# Patient Record
Sex: MALE | Race: WHITE | NOT HISPANIC OR LATINO | Employment: FULL TIME | URBAN - METROPOLITAN AREA
[De-identification: names, ages, dates, MRNs, and addresses within clinical notes are randomized per-mention and may not be internally consistent; named-entity substitution may affect disease eponyms.]

---

## 2023-05-12 ENCOUNTER — OFFICE VISIT (OUTPATIENT)
Dept: PRIMARY CARE | Facility: CLINIC | Age: 63
End: 2023-05-12
Payer: COMMERCIAL

## 2023-05-12 VITALS
DIASTOLIC BLOOD PRESSURE: 78 MMHG | SYSTOLIC BLOOD PRESSURE: 120 MMHG | HEIGHT: 73 IN | RESPIRATION RATE: 17 BRPM | HEART RATE: 68 BPM | BODY MASS INDEX: 28.1 KG/M2 | OXYGEN SATURATION: 98 % | WEIGHT: 212 LBS

## 2023-05-12 DIAGNOSIS — I10 HYPERTENSION, UNSPECIFIED TYPE: ICD-10-CM

## 2023-05-12 DIAGNOSIS — Z00.00 ANNUAL PHYSICAL EXAM: Primary | ICD-10-CM

## 2023-05-12 DIAGNOSIS — E78.00 HYPERCHOLESTEROLEMIA: ICD-10-CM

## 2023-05-12 DIAGNOSIS — E04.1 THYROID NODULE: ICD-10-CM

## 2023-05-12 DIAGNOSIS — I77.810 AORTIC ROOT DILATATION (CMS-HCC): ICD-10-CM

## 2023-05-12 PROBLEM — I35.1 MILD AORTIC REGURGITATION: Status: ACTIVE | Noted: 2023-05-12

## 2023-05-12 PROBLEM — N13.8 BPH WITH OBSTRUCTION/LOWER URINARY TRACT SYMPTOMS: Status: ACTIVE | Noted: 2023-05-12

## 2023-05-12 PROBLEM — K21.9 LARYNGOPHARYNGEAL REFLUX (LPR): Status: ACTIVE | Noted: 2023-05-12

## 2023-05-12 PROBLEM — A48.1 LEGIONELLA PNEUMONIA (MULTI): Status: ACTIVE | Noted: 2023-05-12

## 2023-05-12 PROBLEM — R74.8 ELEVATED ALKALINE PHOSPHATASE LEVEL: Status: ACTIVE | Noted: 2023-05-12

## 2023-05-12 PROBLEM — J34.2 NASAL SEPTAL DEVIATION: Status: ACTIVE | Noted: 2023-05-12

## 2023-05-12 PROBLEM — S09.93XA INJURY OF JAW: Status: ACTIVE | Noted: 2023-05-12

## 2023-05-12 PROBLEM — D64.9 ANEMIA: Status: ACTIVE | Noted: 2023-05-12

## 2023-05-12 PROBLEM — H91.91 HEARING LOSS IN RIGHT EAR: Status: ACTIVE | Noted: 2023-05-12

## 2023-05-12 PROBLEM — N40.1 BPH WITH OBSTRUCTION/LOWER URINARY TRACT SYMPTOMS: Status: ACTIVE | Noted: 2023-05-12

## 2023-05-12 PROBLEM — R17 TOTAL BILIRUBIN, ELEVATED: Status: ACTIVE | Noted: 2023-05-12

## 2023-05-12 PROBLEM — Q76.2 CONGENITAL SPONDYLOLISTHESIS: Status: ACTIVE | Noted: 2023-05-12

## 2023-05-12 PROBLEM — N20.0 CALCULUS OF KIDNEY: Status: ACTIVE | Noted: 2023-05-12

## 2023-05-12 PROBLEM — A48.1 LEGIONELLA PNEUMONIA (MULTI): Status: RESOLVED | Noted: 2023-05-12 | Resolved: 2023-05-12

## 2023-05-12 PROBLEM — R97.20 ELEVATED PSA: Status: ACTIVE | Noted: 2023-05-12

## 2023-05-12 PROBLEM — J30.9 ALLERGIC RHINITIS: Status: ACTIVE | Noted: 2023-05-12

## 2023-05-12 PROCEDURE — 1036F TOBACCO NON-USER: CPT | Performed by: SPECIALIST

## 2023-05-12 PROCEDURE — 3078F DIAST BP <80 MM HG: CPT | Performed by: SPECIALIST

## 2023-05-12 PROCEDURE — 99396 PREV VISIT EST AGE 40-64: CPT | Performed by: SPECIALIST

## 2023-05-12 PROCEDURE — 3074F SYST BP LT 130 MM HG: CPT | Performed by: SPECIALIST

## 2023-05-12 RX ORDER — MULTIVITAMIN
1 TABLET ORAL DAILY
COMMUNITY

## 2023-05-12 RX ORDER — LISINOPRIL 40 MG/1
1 TABLET ORAL DAILY
COMMUNITY
Start: 2012-11-06 | End: 2023-12-12

## 2023-05-12 RX ORDER — GABAPENTIN 600 MG/1
1800 TABLET, FILM COATED ORAL
COMMUNITY
Start: 2019-06-03 | End: 2023-05-19 | Stop reason: SDUPTHER

## 2023-05-12 NOTE — PROGRESS NOTES
"Subjective   Patient ID: Christiano Artis is a 63 y.o. male who presents for Annual Exam.  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, hx of Amarosis Fugax x 2 off ASA since 3/2015, FNA thyroid Nodule, Gallstones    Had greenlight x 2 and plans Holep in 8/3/2022  Last year, hit with hockey stick and thinks he broke rib pain resolved after 6 weeks    Allergies   Allergen Reactions    Ciprofloxacin Rash    Erythromycin Rash    Tetracyclines Rash      Current Outpatient Medications   Medication Instructions    Gralise 1,800 mg, oral, Daily with evening meal    lisinopril 40 mg tablet 1 tablet, oral, Daily    multivitamin tablet 1 tablet, oral, Daily        Review of Systems  Constitutional  No fatigue, no fevers, no chills, no unintentional weight loss,   HEENT:  No headaches, no dizziness, no double vision, no blurred vision, vitreous detachment, positive hearing loss right hearing aid  Cardiovascular:  No chest pain, no palpitations, no shortness of breath with exertion (one flight of stairs),   Respiratory:  No cough, no hemoptysis, no wheezing, No shortness of breath at rest  GI:  No dysphagia, no odynophagia, no reflux, no abdominal pain, no nausea, no vomiting, no changes in bowel habits, no bright red blood per rectum, no melena  :  No urinary frequency, no dysuria, no urine incontinence  MSK:  No falls, no joint pain, no joint swelling  Neuro:  No tremors, no extremity weakness, no changes in sensation    Physical Exam  /78   Pulse 68   Resp 17   Ht 1.854 m (6' 1\")   Wt 96.2 kg (212 lb)   SpO2 98%   BMI 27.97 kg/m²   General: Well-appearing  M in no acute distress, well nourished, well hydrated  Head:  Normocephalic, atraumatic  Eyes:  Anicteric sclera, pupils equal  Ears:       TMs " intact right hearing aid removed for exam    Oral:     Mask in place (not examined due to pandemic)  Neck:   Supple, no cervical/supraclavicular adenopathy, no thyromegaly or nodules appreciated on exam  Cor:      Regular rate, normal S1, S2, no murmurs appreciated, no S3, no S4   Lungs:   Clear to auscultation b/l, no wheezes, no rhonchi, no crackles, no accessory respiratory muscle use  Abd:          Soft, nontender, no guarding, no rebound, no hepatosplenomegaly appreciated   Ext:            No lower extremity edema, no palpable cords  Pulses:      Pedal pulses intact  Neuro:   CN2-12 grossly intact (mask removed for exam)     Assessment/Plan   Problem List Items Addressed This Visit          Circulatory    Aortic root dilatation (CMS/HCC)     Saw cardio, echo 12/2022 not significantly changed from 2020 echo, repeat 3 years         Relevant Orders    Lipid Panel    Hypertension     Controlled on current meds         Relevant Orders    Comprehensive Metabolic Panel    CBC    Lipid Panel       Endocrine/Metabolic    Thyroid nodule     Prior FNA benign, saw Dr. Barkley was to get f/up US in 2018, but did not, will order labs and US and he'll  schedule follow-up with him         Relevant Orders    Thyroxine, Free    Thyroid Stimulating Hormone    US thyroid       Other    Hypercholesterolemia     Labs ordered fx lipids         Relevant Orders    Lipid Panel    Annual physical exam - Primary     -Previously received annual influenza vaccine  -Previously received 2 Covid vaccines, 2 booster, and bivalent booster in 10/27/2022  -Recommend Shingrix vaccines  -Previously received Tdap 12/2022 repeat 10 years  -Fecal occult blood test 2/21/2022  -PSA 10/22/2022 with urology  -Prior Hep C Screen with Dr. Tobias in 2015  -Labs CMP CBC Fx Lipids         Relevant Orders    Comprehensive Metabolic Panel    CBC    Lipid Panel          Krista Campos DO

## 2023-05-12 NOTE — ASSESSMENT & PLAN NOTE
Prior FNA benign, saw Dr. Barkley was to get f/up US in 2018, but did not, will order labs and US and he'll  schedule follow-up with him

## 2023-05-12 NOTE — ASSESSMENT & PLAN NOTE
-Previously received annual influenza vaccine  -Previously received 2 Covid vaccines, 2 booster, and bivalent booster in 10/27/2022  -Recommend Shingrix vaccines  -Previous Pneumovax 2016, recommend PCV 20 age 65  -Previously received Tdap 12/2022 repeat 10 years  -Fecal occult blood test 2/21/2022  -PSA 10/22/2022 with urology  -Prior Hep C Screen with Dr. Tobias in 2015  -Labs CMP CBC Fx Lipids TSH Free T4

## 2023-05-19 DIAGNOSIS — M54.2 NECK PAIN: ICD-10-CM

## 2023-05-19 RX ORDER — GABAPENTIN 600 MG/1
TABLET, FILM COATED ORAL
Qty: 90 TABLET | Refills: 5 | Status: SHIPPED | OUTPATIENT
Start: 2023-05-19 | End: 2023-11-20

## 2023-06-19 ENCOUNTER — LAB (OUTPATIENT)
Dept: LAB | Facility: LAB | Age: 63
End: 2023-06-19
Payer: COMMERCIAL

## 2023-06-19 DIAGNOSIS — I10 HYPERTENSION, UNSPECIFIED TYPE: ICD-10-CM

## 2023-06-19 DIAGNOSIS — Z00.00 ANNUAL PHYSICAL EXAM: ICD-10-CM

## 2023-06-19 DIAGNOSIS — E78.00 HYPERCHOLESTEROLEMIA: ICD-10-CM

## 2023-06-19 DIAGNOSIS — E04.1 THYROID NODULE: ICD-10-CM

## 2023-06-19 DIAGNOSIS — I77.810 AORTIC ROOT DILATATION (CMS-HCC): ICD-10-CM

## 2023-06-19 LAB
ALANINE AMINOTRANSFERASE (SGPT) (U/L) IN SER/PLAS: 17 U/L (ref 10–52)
ALBUMIN (G/DL) IN SER/PLAS: 4.5 G/DL (ref 3.4–5)
ALKALINE PHOSPHATASE (U/L) IN SER/PLAS: 129 U/L (ref 33–136)
ANION GAP IN SER/PLAS: 15 MMOL/L (ref 10–20)
ASPARTATE AMINOTRANSFERASE (SGOT) (U/L) IN SER/PLAS: 24 U/L (ref 9–39)
BILIRUBIN TOTAL (MG/DL) IN SER/PLAS: 1.4 MG/DL (ref 0–1.2)
CALCIUM (MG/DL) IN SER/PLAS: 9.9 MG/DL (ref 8.6–10.6)
CARBON DIOXIDE, TOTAL (MMOL/L) IN SER/PLAS: 28 MMOL/L (ref 21–32)
CHLORIDE (MMOL/L) IN SER/PLAS: 103 MMOL/L (ref 98–107)
CHOLESTEROL (MG/DL) IN SER/PLAS: 196 MG/DL (ref 0–199)
CHOLESTEROL IN HDL (MG/DL) IN SER/PLAS: 43.6 MG/DL
CHOLESTEROL/HDL RATIO: 4.5
CREATININE (MG/DL) IN SER/PLAS: 0.92 MG/DL (ref 0.5–1.3)
ERYTHROCYTE DISTRIBUTION WIDTH (RATIO) BY AUTOMATED COUNT: 13.8 % (ref 11.5–14.5)
ERYTHROCYTE MEAN CORPUSCULAR HEMOGLOBIN CONCENTRATION (G/DL) BY AUTOMATED: 33.3 G/DL (ref 32–36)
ERYTHROCYTE MEAN CORPUSCULAR VOLUME (FL) BY AUTOMATED COUNT: 90 FL (ref 80–100)
ERYTHROCYTES (10*6/UL) IN BLOOD BY AUTOMATED COUNT: 5.19 X10E12/L (ref 4.5–5.9)
GFR MALE: >90 ML/MIN/1.73M2
GLUCOSE (MG/DL) IN SER/PLAS: 89 MG/DL (ref 74–99)
HEMATOCRIT (%) IN BLOOD BY AUTOMATED COUNT: 46.5 % (ref 41–52)
HEMOGLOBIN (G/DL) IN BLOOD: 15.5 G/DL (ref 13.5–17.5)
LDL: 120 MG/DL (ref 0–99)
LEUKOCYTES (10*3/UL) IN BLOOD BY AUTOMATED COUNT: 7.4 X10E9/L (ref 4.4–11.3)
NRBC (PER 100 WBCS) BY AUTOMATED COUNT: 0 /100 WBC (ref 0–0)
PLATELETS (10*3/UL) IN BLOOD AUTOMATED COUNT: 205 X10E9/L (ref 150–450)
POTASSIUM (MMOL/L) IN SER/PLAS: 4.6 MMOL/L (ref 3.5–5.3)
PROTEIN TOTAL: 7.4 G/DL (ref 6.4–8.2)
SODIUM (MMOL/L) IN SER/PLAS: 141 MMOL/L (ref 136–145)
THYROTROPIN (MIU/L) IN SER/PLAS BY DETECTION LIMIT <= 0.05 MIU/L: 1.98 MIU/L (ref 0.44–3.98)
THYROXINE (T4) FREE (NG/DL) IN SER/PLAS: 1.04 NG/DL (ref 0.78–1.48)
TRIGLYCERIDE (MG/DL) IN SER/PLAS: 164 MG/DL (ref 0–149)
UREA NITROGEN (MG/DL) IN SER/PLAS: 20 MG/DL (ref 6–23)
VLDL: 33 MG/DL (ref 0–40)

## 2023-06-19 PROCEDURE — 84439 ASSAY OF FREE THYROXINE: CPT

## 2023-06-19 PROCEDURE — 80053 COMPREHEN METABOLIC PANEL: CPT

## 2023-06-19 PROCEDURE — 80061 LIPID PANEL: CPT

## 2023-06-19 PROCEDURE — 84443 ASSAY THYROID STIM HORMONE: CPT

## 2023-06-19 PROCEDURE — 85027 COMPLETE CBC AUTOMATED: CPT

## 2023-06-19 PROCEDURE — 36415 COLL VENOUS BLD VENIPUNCTURE: CPT

## 2023-09-15 LAB — URINE CULTURE: NO GROWTH

## 2023-11-08 PROBLEM — J34.3 NASAL TURBINATE HYPERTROPHY: Status: ACTIVE | Noted: 2023-11-08

## 2023-11-08 PROBLEM — H93.11 SUBJECTIVE TINNITUS, RIGHT: Status: ACTIVE | Noted: 2023-11-08

## 2023-11-08 PROBLEM — Z87.442 PERSONAL HISTORY OF URINARY CALCULI: Status: ACTIVE | Noted: 2022-07-26

## 2023-11-08 PROBLEM — M48.061 SPINAL STENOSIS OF LUMBAR REGION WITH RADICULOPATHY: Status: ACTIVE | Noted: 2023-11-08

## 2023-11-08 PROBLEM — D73.89 SPLENIC LESION: Status: ACTIVE | Noted: 2023-11-08

## 2023-11-08 PROBLEM — R30.0 DYSURIA: Status: ACTIVE | Noted: 2023-11-08

## 2023-11-08 PROBLEM — J34.89 NASAL OBSTRUCTION: Status: ACTIVE | Noted: 2023-11-08

## 2023-11-08 PROBLEM — N45.1 EPIDIDYMITIS, RIGHT: Status: ACTIVE | Noted: 2023-11-08

## 2023-11-08 PROBLEM — M54.30 SCIATIC PAIN: Status: ACTIVE | Noted: 2023-11-08

## 2023-11-08 PROBLEM — R04.0 EPISTAXIS: Status: ACTIVE | Noted: 2023-11-08

## 2023-11-08 PROBLEM — S80.02XA CONTUSION, KNEE AND LOWER LEG, LEFT, INITIAL ENCOUNTER: Status: ACTIVE | Noted: 2023-11-08

## 2023-11-08 PROBLEM — M65.4 DE QUERVAIN'S TENOSYNOVITIS, RIGHT: Status: ACTIVE | Noted: 2023-11-08

## 2023-11-08 PROBLEM — M79.605 BILATERAL LEG AND FOOT PAIN: Status: ACTIVE | Noted: 2023-11-08

## 2023-11-08 PROBLEM — F41.9 ANXIETY DUE TO INVASIVE PROCEDURE: Status: ACTIVE | Noted: 2023-11-08

## 2023-11-08 PROBLEM — M79.605 PAINFUL LEGS AND MOVING TOES OF LEFT FOOT: Status: ACTIVE | Noted: 2023-11-08

## 2023-11-08 PROBLEM — M79.672 BILATERAL LEG AND FOOT PAIN: Status: ACTIVE | Noted: 2023-11-08

## 2023-11-08 PROBLEM — M19.072 ARTHRITIS OF LEFT FOOT: Status: ACTIVE | Noted: 2023-11-08

## 2023-11-08 PROBLEM — I10 BENIGN ESSENTIAL HYPERTENSION: Status: ACTIVE | Noted: 2023-11-08

## 2023-11-08 PROBLEM — M54.16 LUMBAR RADICULOPATHY, RIGHT: Status: ACTIVE | Noted: 2023-11-08

## 2023-11-08 PROBLEM — M54.9 PAIN OF BACK AND RIGHT LOWER EXTREMITY: Status: ACTIVE | Noted: 2023-11-08

## 2023-11-08 PROBLEM — H40.003 OPEN ANGLE WITH BORDERLINE GLAUCOMA FINDINGS, BILATERAL: Status: ACTIVE | Noted: 2023-06-14

## 2023-11-08 PROBLEM — E66.3 OVERWEIGHT: Status: ACTIVE | Noted: 2023-11-08

## 2023-11-08 PROBLEM — H52.03 HYPEROPIA OF BOTH EYES: Status: ACTIVE | Noted: 2023-06-14

## 2023-11-08 PROBLEM — S01.511A LACERATION OF LIP, INITIAL ENCOUNTER: Status: ACTIVE | Noted: 2023-11-08

## 2023-11-08 PROBLEM — M79.675 PAIN OF TOE OF LEFT FOOT: Status: ACTIVE | Noted: 2023-11-08

## 2023-11-08 PROBLEM — R25.2 LEG CRAMPS: Status: ACTIVE | Noted: 2023-11-08

## 2023-11-08 PROBLEM — N41.0 ACUTE PROSTATITIS: Status: ACTIVE | Noted: 2023-11-08

## 2023-11-08 PROBLEM — H90.3 SENSORINEURAL HEARING LOSS, BILATERAL: Status: ACTIVE | Noted: 2023-11-08

## 2023-11-08 PROBLEM — J98.4 PNEUMONITIS: Status: ACTIVE | Noted: 2023-11-08

## 2023-11-08 PROBLEM — S22.39XA RIB FRACTURE: Status: ACTIVE | Noted: 2023-11-08

## 2023-11-08 PROBLEM — A48.1: Status: ACTIVE | Noted: 2022-07-26

## 2023-11-08 PROBLEM — M25.531 RIGHT WRIST PAIN: Status: ACTIVE | Noted: 2023-11-08

## 2023-11-08 PROBLEM — M79.604 BILATERAL LEG AND FOOT PAIN: Status: ACTIVE | Noted: 2023-11-08

## 2023-11-08 PROBLEM — S01.81XA LACERATION OF CHIN, INITIAL ENCOUNTER: Status: ACTIVE | Noted: 2023-11-08

## 2023-11-08 PROBLEM — H43.813 POSTERIOR VITREOUS DETACHMENT OF BOTH EYES: Status: ACTIVE | Noted: 2023-06-14

## 2023-11-08 PROBLEM — N20.0 NEPHROLITHIASIS: Status: ACTIVE | Noted: 2023-11-08

## 2023-11-08 PROBLEM — I49.5 SICK SINUS SYNDROME (MULTI): Status: ACTIVE | Noted: 2023-11-08

## 2023-11-08 PROBLEM — N41.9 PROSTATITIS: Status: ACTIVE | Noted: 2023-11-08

## 2023-11-08 PROBLEM — M79.671 BILATERAL LEG AND FOOT PAIN: Status: ACTIVE | Noted: 2023-11-08

## 2023-11-08 PROBLEM — L85.1 ACQUIRED PLANTAR POROKERATOSIS: Status: ACTIVE | Noted: 2023-11-08

## 2023-11-08 PROBLEM — M48.07 LUMBOSACRAL SPINAL STENOSIS: Status: ACTIVE | Noted: 2023-11-08

## 2023-11-08 PROBLEM — H25.13 NUCLEAR SCLEROSIS OF BOTH EYES: Status: ACTIVE | Noted: 2023-06-14

## 2023-11-08 PROBLEM — M79.604 PAIN OF BACK AND RIGHT LOWER EXTREMITY: Status: ACTIVE | Noted: 2023-11-08

## 2023-11-08 PROBLEM — M54.16 SPINAL STENOSIS OF LUMBAR REGION WITH RADICULOPATHY: Status: ACTIVE | Noted: 2023-11-08

## 2023-11-08 PROBLEM — S80.12XA CONTUSION, KNEE AND LOWER LEG, LEFT, INITIAL ENCOUNTER: Status: ACTIVE | Noted: 2023-11-08

## 2023-11-08 PROBLEM — M54.2 NECK PAIN: Status: ACTIVE | Noted: 2023-11-08

## 2023-11-08 PROBLEM — M54.50 LOW BACK PAIN: Status: ACTIVE | Noted: 2023-11-08

## 2023-11-08 PROBLEM — S89.92XA KNEE INJURY, LEFT, INITIAL ENCOUNTER: Status: ACTIVE | Noted: 2023-11-08

## 2023-11-08 PROBLEM — M79.675 PAINFUL LEGS AND MOVING TOES OF LEFT FOOT: Status: ACTIVE | Noted: 2023-11-08

## 2023-11-08 PROBLEM — H47.239 OPTIC DISC CUPPING: Status: ACTIVE | Noted: 2023-11-08

## 2023-11-08 RX ORDER — PHENAZOPYRIDINE HYDROCHLORIDE 200 MG/1
200 TABLET, FILM COATED ORAL 3 TIMES DAILY
COMMUNITY
Start: 2023-08-03 | End: 2024-05-10 | Stop reason: WASHOUT

## 2023-11-08 RX ORDER — ALBUTEROL SULFATE 90 UG/1
AEROSOL, METERED RESPIRATORY (INHALATION)
COMMUNITY
Start: 2022-08-04 | End: 2024-05-10 | Stop reason: WASHOUT

## 2023-11-08 RX ORDER — LIDOCAINE 50 MG/G
1 PATCH TOPICAL EVERY 12 HOURS
COMMUNITY
Start: 2023-01-27 | End: 2024-05-10 | Stop reason: WASHOUT

## 2023-11-09 ENCOUNTER — CLINICAL SUPPORT (OUTPATIENT)
Dept: AUDIOLOGY | Facility: CLINIC | Age: 63
End: 2023-11-09
Payer: COMMERCIAL

## 2023-11-09 DIAGNOSIS — H90.3 ASYMMETRICAL SENSORINEURAL HEARING LOSS: Primary | ICD-10-CM

## 2023-11-09 PROCEDURE — V5299 HEARING SERVICE: HCPCS | Performed by: AUDIOLOGIST

## 2023-11-09 PROCEDURE — 92557 COMPREHENSIVE HEARING TEST: CPT

## 2023-11-09 PROCEDURE — 92550 TYMPANOMETRY & REFLEX THRESH: CPT

## 2023-11-09 ASSESSMENT — PAIN - FUNCTIONAL ASSESSMENT: PAIN_FUNCTIONAL_ASSESSMENT: 0-10

## 2023-11-09 ASSESSMENT — PAIN SCALES - GENERAL: PAINLEVEL_OUTOF10: 0 - NO PAIN

## 2023-11-09 NOTE — LETTER
2023     Krista Campos DO  [address]  [facility], [facility] [address]  [facility] [address]    Patient: Christiano Artis   YOB: 1960   Date of Visit: 2023       Dear Dr. Krista Campos DO:    Thank you for referring Christiano Artis to me for evaluation. Below are my notes for this consultation.  If you have questions, please do not hesitate to call me. I look forward to following your patient along with you.       Sincerely,     Shantel Prince      CC: No Recipients  ______________________________________________________________________________________    ADULT AUDIOLOGY AUDIOMETRIC EVALUATION    Name:  Christiano Artis  :  1960  Age:  63 y.o.  Date of Evaluation:  2023    HISTORY  History was obtained from patient: Christiano Artis was seen as a patient of Krista Campos DO for annual hearing assessment. Denies changes for concerns.    RECALL  -Reported some increased difficulty hearing the radio in the car, as Christiano Peña reported needing to adjust the treble and bass  -Noted continued right tinnitus  -Denied otalgia, otorrhea, aural fullness, dizziness, and prior otologic surgery  -Recall an MRI-IAC in  showed no masses in the CPA or IACs.    His most recent audiometric evaluation from 2022 showed left normal hearing sloping to a moderate sensorineural hearing loss and right mild sloping to profound sensorineural hearing loss.    RIGHT: Oticon Opn 2 miniRITE  SN: [id number]  /Dome: size 2, 85 power  w/ 6mm double bass dome and retention piece   WARRANTY EXPIRATION: 2020   FIT DATE: 10/18/2017       AUDIOLOGIC EVALUATION  Otoscopy showed clear ear canals bilaterally.    RIGHT EAR:  -Tympanometry: Type A tympanogram, consistent with normal middle ear pressure and admittance.   -Acoustic reflexes: Ipsilateral acoustic reflexes were absent 500-4000 Hz.    Audiometric evaluation revealed a moderate sensorineural hearing loss through  1000 Hz sloping to a profound sensorineural hearing loss at 8000 Hz with word recognition ability estimated to be fair (64%) based on an NU-6 recorded word list.     Today’s results suggest a stable hearing compared to 2022.    LEFT EAR:  -Tympanometry: Type A tympanogram, consistent with normal middle ear pressure and admittance.   -Acoustic reflexes: Ipsilateral acoustic reflexes were present 500-4000 Hz.    Audiometric evaluation revealed hearing sensitivity within normal limits through 2000 Hz sloping to a moderate sensorineural hearing loss 9202-6243 Hz with word recognition ability estimated to be excellent (100%) based on an NU-6 recorded 25-word list.     Today’s results suggest stable hearing compared to previous testing from 2022      HEARING AID CHECK  Listening check revealed good subjective volume and quality from the hearing aids. We discussed his hearing aid is reaching the end of it's life and that we have limited stock for his device. A second hearing aid will be recommended at time of upgrade.    Billed via purple ticket.    RECOMMENDATIONS:  Treatment Plan:.  Follow up with ENT/PCP as recommended.  Annual hearing assessments as recommended. Follow up sooner if patient feels hearing or symptoms have changed.  Continued full time use of hearing aid  Contact the clinic with questions or concerns at [phone].     PATIENT EDUCATION:   Discussed results and recommendations with patient.  Questions were addressed and the patient was encouraged to contact our department should concerns arise.      Shantel Prince, CCC-A, [facility]  Licensed Audiologist

## 2023-11-09 NOTE — PROGRESS NOTES
ADULT AUDIOLOGY AUDIOMETRIC EVALUATION    Name:  Christiano Artis  :  1960  Age:  63 y.o.  Date of Evaluation:  2023    HISTORY  History was obtained from patient: Christiano Artis was seen as a patient of Krista Campos DO for annual hearing assessment. Denies changes for concerns.    RECALL  -Reported some increased difficulty hearing the radio in the car, as Christiano Peña reported needing to adjust the treble and bass  -Noted continued right tinnitus  -Denied otalgia, otorrhea, aural fullness, dizziness, and prior otologic surgery  -Recall an MRI-IAC in 2017 showed no masses in the CPA or IACs.    His most recent audiometric evaluation from 2022 showed left normal hearing sloping to a moderate sensorineural hearing loss and right mild sloping to profound sensorineural hearing loss.    RIGHT: Oticon Opn 2 miniRITE  SN: [id number]  /Dome: size 2, 85 power  w/ 6mm double bass dome and retention piece   WARRANTY EXPIRATION: 2020   FIT DATE: 10/18/2017       AUDIOLOGIC EVALUATION  Otoscopy showed clear ear canals bilaterally.    RIGHT EAR:  -Tympanometry: Type A tympanogram, consistent with normal middle ear pressure and admittance.   -Acoustic reflexes: Ipsilateral acoustic reflexes were absent 500-4000 Hz.    Audiometric evaluation revealed a moderate sensorineural hearing loss through 1000 Hz sloping to a profound sensorineural hearing loss at 8000 Hz with word recognition ability estimated to be fair (64%) based on an NU-6 recorded word list.     Today’s results suggest a stable hearing compared to .    LEFT EAR:  -Tympanometry: Type A tympanogram, consistent with normal middle ear pressure and admittance.   -Acoustic reflexes: Ipsilateral acoustic reflexes were present 500-4000 Hz.    Audiometric evaluation revealed hearing sensitivity within normal limits through 2000 Hz sloping to a moderate sensorineural hearing loss 0151-1089 Hz with word recognition ability estimated to  be excellent (100%) based on an NU-6 recorded 25-word list.     Today’s results suggest stable hearing compared to previous testing from 2022      HEARING AID CHECK  Listening check revealed good subjective volume and quality from the hearing aids. We discussed his hearing aid is reaching the end of it's life and that we have limited stock for his device. A second hearing aid will be recommended at time of upgrade.    Billed via purple ticket.    RECOMMENDATIONS:  Treatment Plan:.  Follow up with ENT/PCP as recommended.  Annual hearing assessments as recommended. Follow up sooner if patient feels hearing or symptoms have changed.  Continued full time use of hearing aid  Contact the clinic with questions or concerns at [phone].     PATIENT EDUCATION:   Discussed results and recommendations with patient.  Questions were addressed and the patient was encouraged to contact our department should concerns arise.      Shantel Prince, CCC-A, [facility]  Licensed Audiologist

## 2023-11-14 ENCOUNTER — OFFICE VISIT (OUTPATIENT)
Dept: UROLOGY | Facility: CLINIC | Age: 63
End: 2023-11-14
Payer: COMMERCIAL

## 2023-11-14 VITALS — TEMPERATURE: 97.8 F | BODY MASS INDEX: 30.42 KG/M2 | HEIGHT: 70 IN

## 2023-11-14 DIAGNOSIS — N40.1 BPH WITH OBSTRUCTION/LOWER URINARY TRACT SYMPTOMS: ICD-10-CM

## 2023-11-14 DIAGNOSIS — R97.20 ELEVATED PSA: Primary | ICD-10-CM

## 2023-11-14 DIAGNOSIS — N13.8 BPH WITH OBSTRUCTION/LOWER URINARY TRACT SYMPTOMS: ICD-10-CM

## 2023-11-14 PROCEDURE — 51741 ELECTRO-UROFLOWMETRY FIRST: CPT | Performed by: UROLOGY

## 2023-11-14 PROCEDURE — 3078F DIAST BP <80 MM HG: CPT | Performed by: UROLOGY

## 2023-11-14 PROCEDURE — 1036F TOBACCO NON-USER: CPT | Performed by: UROLOGY

## 2023-11-14 PROCEDURE — 99213 OFFICE O/P EST LOW 20 MIN: CPT | Performed by: UROLOGY

## 2023-11-14 PROCEDURE — 3074F SYST BP LT 130 MM HG: CPT | Performed by: UROLOGY

## 2023-11-14 PROCEDURE — 51798 US URINE CAPACITY MEASURE: CPT | Performed by: UROLOGY

## 2023-11-14 ASSESSMENT — PAIN SCALES - GENERAL: PAINLEVEL: 0-NO PAIN

## 2023-11-14 NOTE — PROGRESS NOTES
Christiano Artis is a 63 y.o. male with history of BPH with LUTS s/p HoLEP on 8/3/2023 presents for 3 month post-op s/p HoLEP.    Path: 19.6 grams of benign tissue removed.    Patient reports he is doing excellent since the procedure. He has occasional residual urinary frequency, all other urinary symptoms have resolved. He has occasional post-void dribbling which is not bothersome.    IPSS: 1 and 1  PVR: 58ml   Uroflow:  Only voided 18cc's, this is poorly diagnostic.     Current Outpatient Medications on File Prior to Visit   Medication Sig Dispense Refill    albuterol 90 mcg/actuation inhaler Inhale.      cephalexin (Keflex) 500 mg capsule TAKE 1 CAPSULE BY MOUTH THREE TIMES A DAY AFTER MEALS 21 capsule 0    diclofenac sodium 1 % kit Apply 1 Application topically twice a day.      gabapentin (Gralise) 600 mg tablet extended release 24 hr Take 3 capsules (1,800mg) by mouth daily with dinner 90 tablet 5    lidocaine (Lidoderm) 5 % patch Place 1 patch on the skin every 12 hours. Apply 1 patch and leave in place for 12 hours, then remove and leave off for 12 hours.      lisinopril 40 mg tablet Take 1 tablet (40 mg) by mouth once daily.      multivitamin tablet Take 1 tablet by mouth once daily.      phenazopyridine (Pyridium) 200 mg tablet Take 1 tablet (200 mg) by mouth 3 times a day.       No current facility-administered medications on file prior to visit.     Lab Results   Component Value Date    PSA 1.71 10/13/2022    PSA 2.99 02/15/2022    PSA 3.50 05/17/2021       Plan:    BPH with LUTS s/p HoLEP on 8/3/2023     Patient reports he is doing excellent since the procedure. He has occasional residual urinary frequency, all other urinary symptoms have resolved. He has occasional post-void dribbling which is not bothersome.    I advised the patient to monitor post-void dribbling, if this worsens we will need to proceed with cystoscopy to r/o BNC vs. Ureteral stricture.     We will check PSA now to establish new  baseline.    Follow up annually with PSA prior.     All questions were answered to the patient's satisfaction. Patient agrees with the plan and wishes to proceed. Follow-up will be scheduled appropriately.     Scribed for Dr. Zarate by Binta Dejesus. I , Dr Zarate, have personally reviewed and agreed with the information entered by the Virtual Scribe.

## 2023-11-19 DIAGNOSIS — M54.2 NECK PAIN: ICD-10-CM

## 2023-11-20 RX ORDER — GABAPENTIN 600 MG/1
TABLET, FILM COATED ORAL
Qty: 90 TABLET | Refills: 3 | Status: SHIPPED | OUTPATIENT
Start: 2023-11-20 | End: 2024-03-25 | Stop reason: SDUPTHER

## 2023-12-11 DIAGNOSIS — I10 PRIMARY HYPERTENSION: ICD-10-CM

## 2023-12-12 RX ORDER — LISINOPRIL 40 MG/1
40 TABLET ORAL DAILY
Qty: 90 TABLET | Refills: 1 | Status: SHIPPED | OUTPATIENT
Start: 2023-12-12 | End: 2024-06-08

## 2024-02-08 ENCOUNTER — CLINICAL SUPPORT (OUTPATIENT)
Dept: AUDIOLOGY | Facility: CLINIC | Age: 64
End: 2024-02-08

## 2024-02-08 DIAGNOSIS — H90.3 SENSORINEURAL HEARING LOSS, BILATERAL: Primary | ICD-10-CM

## 2024-02-08 PROCEDURE — V5299 HEARING SERVICE: HCPCS | Performed by: AUDIOLOGIST

## 2024-02-08 ASSESSMENT — PAIN SCALES - GENERAL: PAINLEVEL_OUTOF10: 0 - NO PAIN

## 2024-02-08 ASSESSMENT — PAIN - FUNCTIONAL ASSESSMENT: PAIN_FUNCTIONAL_ASSESSMENT: 0-10

## 2024-02-08 NOTE — PROGRESS NOTES
Mr. Artis was seen today for a hearing aid check.  He wears a right hearing aid only that was fit 6 years ago.  He states that it stopped working 3 weeks ago.  He has tried numerous batteries with no success.  I tried both a new battery and new  on this hearing aid with no success.  It is not working at all at this time.  I recommended he consider a new hearing aid at this time due to the age of his current hearing aid.  I also recommended that he consider a left hearing aid as well.      RIGHT: Oticon Opn 2 miniRITE  SN: [id number]  /Dome: size 2, 85 power  w/ 6mm double bass dome and retention piece   WARRANTY EXPIRATION: 11/5/2020   FIT DATE: 10/18/2017     Treatment Plan:   Hearing aid consultation with Dr. Edwards.   Consider bilateral hearing aids at this time.

## 2024-03-04 ENCOUNTER — CLINICAL SUPPORT (OUTPATIENT)
Dept: AUDIOLOGY | Facility: CLINIC | Age: 64
End: 2024-03-04

## 2024-03-04 DIAGNOSIS — H90.3 SENSORINEURAL HEARING LOSS, BILATERAL: Primary | ICD-10-CM

## 2024-03-04 PROCEDURE — 92700 UNLISTED ORL SERVICE/PX: CPT

## 2024-03-04 ASSESSMENT — PAIN - FUNCTIONAL ASSESSMENT: PAIN_FUNCTIONAL_ASSESSMENT: 0-10

## 2024-03-04 ASSESSMENT — PAIN SCALES - GENERAL: PAINLEVEL_OUTOF10: 0 - NO PAIN

## 2024-03-04 NOTE — PROGRESS NOTES
AUDIOLOGY HEARING AID EVALUATION      Name:  Christiano Artis  :  1960  Age:  64 y.o.  Date of Encounter:  3/4/2024     Time: 830-910    HISTORY    Mr. Artis was seen today for a hearing aid consultation. A previous hearing evaluation on 2023 indicated mild sloping to profound sensorineural hearing loss in the right ear and hearing within normal limits sloping to a moderate sensorineural hearing loss in the left ear, with poor word recognition in the right ear and excellent word recognition in the left ear. Christiano indicates he is interested in a new right hearing aid and is still unsure about aiding his left ear. Recall, an MRI-IAC in 2017 showed no masses in the CPA or IACs     Top listening environments to improve:  Work  Refereeing for ice hockey    Most important consideration for hearing aids: Monaural vs Binaural Fitting    IMPRESSIONS AND RECOMMENDATIONS   The hearing test from 2023 was reviewed with the patient. They are a hearing aid candidate, bilaterally. The benefits and drawbacks of different hearing aid styles and levels of technology were reviewed. We reviewed the importance of fitting both ears with a hearing aid and binaural benefits of hearing. We discussed his insurance coverage including in and out of network benefits. [facility] is out of network. He would like to think about purchasing monaural vs. binaural hearing aids. Should he like to be fit we will order rechargeable -in-the-Ear (RITE) style hearing aids, with the standard level technology, with a 2, 100 dB  and power domes for the right ear, and/or 2, 85 dB  and 6mm open bass domes for the left ear. Pricing and policies were reviewed. Basic hearing aid use and parts were discussed.    *Note: Have patient confirm color choice and monaural vs binaural fit.    Hearing Aid Information Right Left    Oticon Oticon   Model Intent 3 miniRITE-R Intent 3 miniRITE-R    2, 100 dB   2, 85 dB   Dome 6 mm power 6 mm open     Should the patient call and like to proceed with purchasing hearing aids through our office, the hearing aids will be ordered from the . Note, have the patient confirm color choice and monaural vs binaural fit. The patient's insurance does not require a prior authorization. He can choose to prompt pay and submit a bill to his insurance for reimbursement or we can bill his insurance directly.     The hearing aid consultation fee will be billed to the patient via purple ticket.     Medical clearance form will be faxed/routed to his PCP, Krista Campos DO.     TREATMENT PLAN  Contact your audiologist if you would like to purchase hearing aids through our office. Call [phone] and request a call back from Shantel Mathias, CCC-A.  Return for audiologic assessment in conjunction with otologic care or annually, whichever is sooner. Return sooner if concerns arise.       SHANTEL Cerna, CCC-A   Clinical Audiologist

## 2024-03-05 DIAGNOSIS — N13.8 BPH WITH OBSTRUCTION/LOWER URINARY TRACT SYMPTOMS: Primary | ICD-10-CM

## 2024-03-05 DIAGNOSIS — Z79.2 NEED FOR PROPHYLACTIC ANTIBIOTIC: ICD-10-CM

## 2024-03-05 DIAGNOSIS — N40.1 BPH WITH OBSTRUCTION/LOWER URINARY TRACT SYMPTOMS: Primary | ICD-10-CM

## 2024-03-05 DIAGNOSIS — F41.9 ANXIETY DUE TO INVASIVE PROCEDURE: ICD-10-CM

## 2024-03-06 RX ORDER — DIAZEPAM 10 MG/1
10 TABLET ORAL ONCE
Qty: 1 TABLET | Refills: 0 | Status: SHIPPED | OUTPATIENT
Start: 2024-03-06 | End: 2024-05-10 | Stop reason: WASHOUT

## 2024-03-06 RX ORDER — CEPHALEXIN 500 MG/1
500 CAPSULE ORAL ONCE
Qty: 1 CAPSULE | Refills: 0 | Status: SHIPPED | OUTPATIENT
Start: 2024-03-06 | End: 2024-03-06

## 2024-03-25 DIAGNOSIS — M54.2 NECK PAIN: ICD-10-CM

## 2024-03-25 RX ORDER — GABAPENTIN 600 MG/1
TABLET, FILM COATED ORAL
Qty: 90 TABLET | Refills: 1 | Status: SHIPPED | OUTPATIENT
Start: 2024-03-25 | End: 2024-05-14 | Stop reason: SDUPTHER

## 2024-03-26 RX ORDER — GABAPENTIN 600 MG/1
TABLET, FILM COATED ORAL
Qty: 90 TABLET | Refills: 6 | OUTPATIENT
Start: 2024-03-26

## 2024-04-26 ENCOUNTER — CLINICAL SUPPORT (OUTPATIENT)
Dept: AUDIOLOGY | Facility: CLINIC | Age: 64
End: 2024-04-26

## 2024-04-26 DIAGNOSIS — H90.3 SENSORINEURAL HEARING LOSS, BILATERAL: Primary | ICD-10-CM

## 2024-04-26 PROCEDURE — V5110 HEARING AID DISPENSING FEE: HCPCS | Mod: AUDSP | Performed by: AUDIOLOGIST

## 2024-04-26 PROCEDURE — V5261 HEARING AID, DIGIT, BIN, BTE: HCPCS | Mod: AUDSP

## 2024-04-26 ASSESSMENT — PAIN - FUNCTIONAL ASSESSMENT: PAIN_FUNCTIONAL_ASSESSMENT: 0-10

## 2024-04-26 ASSESSMENT — PAIN SCALES - GENERAL: PAINLEVEL_OUTOF10: 0 - NO PAIN

## 2024-04-30 NOTE — PROGRESS NOTES
AUDIOLOGY HEARING AID FITTING      Name:  Christiano Artis   :  1960   Age:  64 y.o.   Date of Evaluation:  2024     Time: 840-930    HISTORY:  Patient arrived today for hearing aid fitting. Most recent audiologic evaluation performed on 2023 revealed a mild sloping to profound sensorineural hearing loss in the right ear and hearing within normal limits sloping to a moderate sensorineural hearing loss in the left ear, with poor word recognition in the right ear and excellent word recognition in the left ear. Patient received medical clearance to continue with amplification from Dr. Krista Campos on 3/5/24.      HEARING AIDS:    Hearing Aid Information Right Left    Oticon Oticon   Model Intent 3 miniRITE-R Intent 3 miniRITE-R   Serial Number [id number]   /Tubing 2, 100 dB 2, 85 dB   Dome 10 mm power 8 mm open   Retention No No   Wax Traps ProWax miniFit ProWax miniFit     Repair Warranty 27   Loss and Damage Warranty 27   Fitting Date 2024    Fitting Audiologist Shantel Mathias, CCC-A  Clinical Audiologist       Paired to Phone for phone calls: No  Paired to smart phone application: No  Gain Level: Adaptation 2  Volume controls active: Yes  Fit to Audiogram performed on 23      PROGRAMMING, VERIFICATION, and VALIDATION MEASURES:  Otoscopy revealed clear ear canals with visible cones of light bilaterally.      Measurements to account for unique size and shape of Mr. Artis's ears in hearing aid programming included: real ear measures. Adjustments were made based on measurement of soft (55 dB SPL), average (65 dB SPL), and loud (75 dB dB SPL) speech, as well as maximum permissible output (MPO), to ensure that Mr. Artis is being provided with the most appropriate amplification. The devices were found to meet prescriptive targets from 250-8000 Hz for for both ears.      AIDED TESTING:  Sound field testing will be completed at his follow-up appointment.      IMPRESSIONS:  Today's 1-hour hearing aid fitting appointment was spent discussing use, care, and maintenance of the hearing devices. The patient was able to properly insert and remove the hearing instrument from the ear.  In addition to today's verbal instruction of the hearing devices, the patient was given written instructions from the hearing aid . Hearing aid limitations were discussed at length as well as realistic expectations. The patient was advised in order to receive full benefit of amplification, consistent use during all waking hours is recommended. The repair warranty (coverage/cost from the hearing aid  and not the responsibility of [facility]) and the conditions of the 30 day right-to-return period (ends 5/24/24) were reviewed.    We will evaluate if he needs an earmold made for the right ear if feedback persists, following his fitting.     He has an [employer] hearing aid benefit that covers 75% of the devices up to $2000 after he meets his $400 deductible. He does not require prior authorization. He chose to pay in full today to receive the prompt pay discount and will submit the receipt to his insurance.       RECOMMENDATIONS:  Strive for full-time device use during waking hours, except when activities preclude device safety.  Return for hearing aid fitting follow-up appointment.      PATIENT EDUCATION:  Discussed results and recommendations with Mr. Artis. Questions were addressed and the patient was encouraged to contact our department at [phone] should concerns arise.       Shantel Mathias, CCC-A  Clinical Audiologist

## 2024-05-07 ENCOUNTER — PROCEDURE VISIT (OUTPATIENT)
Dept: UROLOGY | Facility: CLINIC | Age: 64
End: 2024-05-07
Payer: COMMERCIAL

## 2024-05-07 VITALS
HEART RATE: 87 BPM | SYSTOLIC BLOOD PRESSURE: 137 MMHG | TEMPERATURE: 98 F | DIASTOLIC BLOOD PRESSURE: 92 MMHG | WEIGHT: 208 LBS | HEIGHT: 73 IN | BODY MASS INDEX: 27.57 KG/M2

## 2024-05-07 DIAGNOSIS — N39.43 POST-VOID DRIBBLING: ICD-10-CM

## 2024-05-07 LAB
POC APPEARANCE, URINE: CLEAR
POC BILIRUBIN, URINE: NEGATIVE
POC BLOOD, URINE: NEGATIVE
POC COLOR, URINE: YELLOW
POC GLUCOSE, URINE: NEGATIVE MG/DL
POC KETONES, URINE: NEGATIVE MG/DL
POC LEUKOCYTES, URINE: NEGATIVE
POC NITRITE,URINE: NEGATIVE
POC PH, URINE: 8 PH
POC PROTEIN, URINE: NEGATIVE MG/DL
POC SPECIFIC GRAVITY, URINE: 1.01
POC UROBILINOGEN, URINE: 0.2 EU/DL

## 2024-05-07 PROCEDURE — 52000 CYSTOURETHROSCOPY: CPT | Performed by: UROLOGY

## 2024-05-07 PROCEDURE — 81002 URINALYSIS NONAUTO W/O SCOPE: CPT | Performed by: UROLOGY

## 2024-05-07 NOTE — PROGRESS NOTES
Subjective   Christiano Artis is a 64 y.o. male with history of BPH with LUTS s/p HoLEP on 8/3/2023, presents today for a follow up visit. During his 3 month post-op visit patient reported occasional residual urinary frequency and  post-void dribbling. Patient presents today for cystoscopy.         Past Medical History:   Diagnosis Date    Calculus of kidney 10/01/2018    Calculus of kidney    Calculus of kidney 10/01/2018    Calculus of kidney    Cellulitis, unspecified 01/04/2014    Cellulitis    Encounter for immunization 05/18/2015    Need for Td vaccine    Encounter for immunization 02/26/2015    Need for immunization against typhoid    Encounter for immunization 11/13/2018    Need for influenza vaccination    Encounter for screening for other viral diseases 05/18/2015    Need for hepatitis C screening test    Fever, unspecified 05/27/2020    Fever and chills    Legionella pneumonia (Multi) 05/12/2023    Other conditions influencing health status 08/06/2012    Infected Nonvenomous Insect Bite On Trunk    Other specified disorders of right ear 11/28/2016    Blocked ear, right    Personal history of other diseases of the circulatory system 04/12/2016    History of paroxysmal supraventricular tachycardia    Personal history of other diseases of the digestive system 11/23/2012    History of cholelithiasis    Personal history of other diseases of the nervous system and sense organs 04/09/2014    History of amaurosis fugax    Personal history of other diseases of the respiratory system 12/10/2018    History of acute sinusitis    Personal history of other drug therapy 02/26/2015    History of hepatitis A vaccination    Personal history of other infectious and parasitic diseases     History of varicella    Personal history of other specified conditions 05/27/2020    History of dysuria    Prostatic intraepithelial neoplasia     High grade prostatic intraepithelial neoplasia    Unspecified acute conjunctivitis, right eye  12/10/2018    Acute bacterial conjunctivitis of right eye     Past Surgical History:   Procedure Laterality Date    ABLATION OF DYSRHYTHMIC FOCUS  03/04/2015    Catheter Ablation    CT HEAD ANGIO W AND WO IV CONTRAST  10/9/2017    CT HEAD ANGIO W AND WO IV CONTRAST 10/9/2017 [facility]    LUMBAR FUSION  08/28/2016    Lumbar Vertebral Fusion    MOUTH SURGERY  11/05/2012    Oral Surgery Tooth Extraction    OTHER SURGICAL HISTORY  08/28/2016    Laminectomy Decompressive More Than Two Lumbar Segments    OTHER SURGICAL HISTORY  08/28/2016    Spinal Surgery - Allograft    OTHER SURGICAL HISTORY  10/13/2022    Back surgery    OTHER SURGICAL HISTORY  01/10/2014    Cystoscopy With Ureteroscopy With Lithotripsy    OTHER SURGICAL HISTORY  10/21/2020    Orthopedic Surgery    OTHER SURGICAL HISTORY  12/21/2013    Cystoscopy With Insertion Of Ureteral Stent Right    VASECTOMY  11/05/2012    Surgery Vas Deferens Vasectomy     Family History   Problem Relation Name Age of Onset    Other (htn) Mother      Heart failure Father      Coronary artery disease Father      Other (htn) Father      Nephrolithiasis Father      Other (prediabetes) Father          Impaired Fasting Glucose    Other (htn) Sister      Hypertension Brother      Allergies Other       Current Outpatient Medications   Medication Sig Dispense Refill    albuterol 90 mcg/actuation inhaler Inhale.      cephalexin (Keflex) 500 mg capsule TAKE 1 CAPSULE BY MOUTH THREE TIMES A DAY AFTER MEALS 21 capsule 0    diazePAM (Valium) 10 mg tablet Take 1 tablet (10 mg) by mouth 1 time for 1 dose. Take 1 tablet by mouth 1 hour prior to cystoscopy 1 tablet 0    diclofenac sodium 1 % kit Apply 1 Application topically twice a day.      gabapentin (Gralise) 600 mg tablet extended release 24 hr Take 3 capsules (1,800mg) by mouth daily with dinner----DUE FOR APPT 90 tablet 1    lidocaine (Lidoderm) 5 % patch Place 1 patch on the skin every 12 hours. Apply 1 patch and leave in  place for 12 hours, then remove and leave off for 12 hours.      lisinopril 40 mg tablet Take 1 tablet (40 mg) by mouth once daily. 90 tablet 1    multivitamin tablet Take 1 tablet by mouth once daily.      phenazopyridine (Pyridium) 200 mg tablet Take 1 tablet (200 mg) by mouth 3 times a day.       No current facility-administered medications for this visit.     Allergies   Allergen Reactions    Azithromycin Unknown    Sulfamethoxazole-Trimethoprim Unknown    Ciprofloxacin Rash    Erythromycin Rash    Tetracyclines Rash     Social History     Socioeconomic History    Marital status:      Spouse name: Not on file    Number of children: Not on file    Years of education: Not on file    Highest education level: Not on file   Occupational History    Not on file   Tobacco Use    Smoking status: Never    Smokeless tobacco: Never   Substance and Sexual Activity    Alcohol use: Not on file    Drug use: Not on file    Sexual activity: Not on file   Other Topics Concern    Not on file   Social History Narrative    Not on file     Social Determinants of Health     Financial Resource Strain: Not on file   Food Insecurity: No Food Insecurity (12/16/2023)    Received from [facility]    Hunger Screening     Within the past 12 months we worried whether our food would run out before we got money to buy more.: Never True     Within the past 12 months the food we bought just didn't last and we didn't have money to get more.: Never True   Transportation Needs: Not on file   Physical Activity: Not on file   Stress: Not on file   Social Connections: Not on file   Intimate Partner Violence: Not on file   Housing Stability: Not on file       Review of Systems  Pertinent items are noted in HPI.    Objective   Cystoscopy performed:   Christiano Artis identified using two (2) forms of identification.  Procedure: diagnostic cystourethroscopy  Indications for procedure: BPH with LUTS Risks, benefits, and alternatives were  discussed in detail.   Patient appears to understand and agrees to proceed.   Patient has signed the procedure consent form.     Cystoscopy findings:  Urethra: normal course and caliber, no evidence of stricture or lesion.  Prostate: non-obstructing. No residual adenoma   Bladder: normal capacity, no trabeculations, no diverticulum, no stone, tumors or other lesions.  Post-cystoscopy: Patient tolerated procedure without complications.    [] Lateral hypertrophy, with complete channel occlusion.  [] Obstructing median lobe with intravesical protrusion.  [] Good sphincter coaptation.  [] Normal bladder.       Lab Review  Lab Results   Component Value Date    WBC 7.7 08/05/2023    RBC 4.67 08/05/2023    HGB 13.8 08/05/2023    HCT 41.0 08/05/2023    PLT 77 (L) 08/05/2023      Lab Results   Component Value Date    BUN 10 08/05/2023    CREATININE 0.86 08/05/2023      Lab Results   Component Value Date    PSA 1.71 10/13/2022     Urine analysis shows negative    Assessment/Plan   There are no diagnoses linked to this encounter.    BPH with LUTS s/p HoLEP on 8/3/2023     Cystoscopy today showed no stricture, no residual adenoma and normal bladder.     Patient has persistent post-void dribbling, likely secondary to pelvic floor dysfunction.     We will refer patient to PFPT.     All questions were answered to the patient's satisfaction. Patient agrees with the plan and wishes to proceed. Follow-up will be scheduled appropriately.     Scribed for Dr. Zarate by Binta Dejesus. I , Dr Zarate, have personally reviewed and agreed with the information entered by the Virtual Scribe.

## 2024-05-10 ENCOUNTER — CLINICAL SUPPORT (OUTPATIENT)
Dept: AUDIOLOGY | Facility: CLINIC | Age: 64
End: 2024-05-10

## 2024-05-10 ENCOUNTER — OFFICE VISIT (OUTPATIENT)
Dept: PRIMARY CARE | Facility: CLINIC | Age: 64
End: 2024-05-10
Payer: COMMERCIAL

## 2024-05-10 VITALS
BODY MASS INDEX: 28.23 KG/M2 | DIASTOLIC BLOOD PRESSURE: 74 MMHG | SYSTOLIC BLOOD PRESSURE: 112 MMHG | OXYGEN SATURATION: 98 % | HEART RATE: 70 BPM | WEIGHT: 214 LBS | RESPIRATION RATE: 16 BRPM

## 2024-05-10 DIAGNOSIS — Z82.49 FAMILY HISTORY OF CORONARY ARTERY DISEASE IN FATHER: ICD-10-CM

## 2024-05-10 DIAGNOSIS — E04.1 THYROID NODULE: ICD-10-CM

## 2024-05-10 DIAGNOSIS — M54.2 NECK PAIN: ICD-10-CM

## 2024-05-10 DIAGNOSIS — H90.3 SENSORINEURAL HEARING LOSS, BILATERAL: Primary | ICD-10-CM

## 2024-05-10 DIAGNOSIS — I10 BENIGN ESSENTIAL HYPERTENSION: ICD-10-CM

## 2024-05-10 DIAGNOSIS — I10 HYPERTENSION, UNSPECIFIED TYPE: ICD-10-CM

## 2024-05-10 DIAGNOSIS — E78.00 HYPERCHOLESTEROLEMIA: ICD-10-CM

## 2024-05-10 DIAGNOSIS — I77.810 AORTIC ROOT DILATATION (CMS-HCC): ICD-10-CM

## 2024-05-10 DIAGNOSIS — H90.3 ASYMMETRICAL SENSORINEURAL HEARING LOSS: ICD-10-CM

## 2024-05-10 DIAGNOSIS — D73.89 SPLENIC LESION: ICD-10-CM

## 2024-05-10 DIAGNOSIS — I35.1 MILD AORTIC REGURGITATION: ICD-10-CM

## 2024-05-10 DIAGNOSIS — Z00.00 ANNUAL PHYSICAL EXAM: Primary | ICD-10-CM

## 2024-05-10 DIAGNOSIS — Z12.5 PROSTATE CANCER SCREENING: ICD-10-CM

## 2024-05-10 PROCEDURE — 3078F DIAST BP <80 MM HG: CPT | Performed by: SPECIALIST

## 2024-05-10 PROCEDURE — 1159F MED LIST DOCD IN RCRD: CPT | Performed by: SPECIALIST

## 2024-05-10 PROCEDURE — 99397 PER PM REEVAL EST PAT 65+ YR: CPT | Performed by: SPECIALIST

## 2024-05-10 PROCEDURE — 1160F RVW MEDS BY RX/DR IN RCRD: CPT | Performed by: SPECIALIST

## 2024-05-10 PROCEDURE — 3074F SYST BP LT 130 MM HG: CPT | Performed by: SPECIALIST

## 2024-05-10 PROCEDURE — 1036F TOBACCO NON-USER: CPT | Performed by: SPECIALIST

## 2024-05-10 PROCEDURE — 1157F ADVNC CARE PLAN IN RCRD: CPT | Performed by: SPECIALIST

## 2024-05-10 ASSESSMENT — PAIN SCALES - GENERAL: PAINLEVEL_OUTOF10: 0 - NO PAIN

## 2024-05-10 ASSESSMENT — PAIN - FUNCTIONAL ASSESSMENT: PAIN_FUNCTIONAL_ASSESSMENT: 0-10

## 2024-05-10 NOTE — PROGRESS NOTES
ADULT HEARING AID CHECK      Name:  Christiano Artis   :  1960   Age:  64 y.o.   Date of Evaluation:  5/10/2024     Time: 0017-8388    HISTORY:  Christiano Artis is in for a hearing aid check following his fitting two weeks ago. He is doing well overall. He still feels that he hears his own voice significantly louder in his right ear, where it almost sounds nasal. He also struggles in background noise, like restaurants. He noted he hears more of the background noise than the people in front of him. He had some feedback in his right. Lastly, he had concerns that both hearing aids were not producing the start-up chime.       HEARING AIDS:     Hearing Aid Information Right Left    Oticon Oticon   Model Intent 3 miniRITE-R Intent 3 miniRITE-R   Serial Number [id number]   /Tubing 2, 100 dB 2, 85 dB   Dome 10 mm power 10 mm open   Retention No No   Wax Traps ProWax miniFit ProWax miniFit      Repair Warranty 27   Loss and Damage Warranty 27   Fitting Date 2024    Fitting Audiologist Pennie Llanes, Shantel, CCC-A  Clinical Audiologist         Paired to Phone for phone calls: Yes  Paired to smart phone application: Yes  Gain Level: Adaptation 2 RE, 3 LE  Volume controls active: Yes  Fit to Audiogram performed on 23     Programs:  General  Speech in Noise       IMPRESSIONS:  I reduced gain at soft input levels for mid and high frequencies 4 CU overall. I created a second Speech in Noise program to utilize in restaurants that have fixed omni-directional mics and have neural noise suppression set to difficult. I counseled on inserting the  further into the ear canal to reduce feedback; if it continues to be an issue we can have a custom earmold made. Lastly, I noted that both hearing aids will produce the start-up chime, however, it is likely that the second chime has already played before he inserted the hearing aid into his ear.     I paired him to his phone and the cain  today. I demonstrated phone calls and streaming audio.     Try to complete aided testing at his next follow-up. He was not charged for today's appointment as he is still within the first 90 days of his fitting.       RECOMMENDATIONS:  1.) Continue full-time use of the hearing aids except when device safety negates it.  2.) Follow-up with your ENT physician as recommended.  3.) Return for annual audiologic assessments and hearing aid checks.   4.) Contact your audiologist with any questions or concerns so it can be taken care of in a timely manner.         Shantel Mathias, CCC-A  Clinical Audiologist

## 2024-05-10 NOTE — PROGRESS NOTES
Subjective   Patient ID: Christiano Artis is a 64 y.o. male who presents for Follow-up.  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, hx of Amarosis Fugax x 2 off ASA since 3/2015, FNA thyroid Nodule, Gallstones, Legionella Pneumonia here for annual exam (was scheduled for follow-up)    Needs 3 month supply gralise due soon, to send to [facility] target  Training for 18 mile hike in [facility]   Retiring end of year    Allergies   Allergen Reactions    Azithromycin Unknown    Sulfamethoxazole-Trimethoprim Unknown    Ciprofloxacin Rash    Erythromycin Rash    Tetracyclines Rash      Current Outpatient Medications   Medication Instructions    gabapentin (Gralise) 600 mg tablet extended release 24 hr Take 3 capsules (1,800mg) by mouth daily with dinner    lisinopril 40 mg, oral, Daily    multivitamin tablet 1 tablet, oral, Daily        Review of Systems  Constitutional  No fatigue, no fevers, no chills, no unintentional weight loss,   HEENT:  No headaches, no dizziness, no double vision, no blurred vision, current eye exams (plans in June) using hearing aids for hearing loss (virus right ear) prior MRI IAC 8/2017  Cardiovascular:  No chest pain, no palpitations, no shortness of breath with exertion (one flight of stairs),   Respiratory:  No cough, no hemoptysis, no wheezing, No shortness of breath at rest  GI:  No dysphagia, no odynophagia, no reflux, no abdominal pain, no nausea, no vomiting, no changes in bowel/bladder habits, no bright red blood per rectum, no melena  :  No urinary frequency, no dysuria, no urine incontinence. No nocturia  MSK:  No falls, no joint pain, no joint swelling  Neuro:  No tremors, no extremity weakness, no changes in sensation    Physical Exam  /74   Pulse 70   Resp 16    Wt 97.1 kg (214 lb)   SpO2 98%   BMI 28.23 kg/m²   General: Well-appearing  M in no acute distress, well nourished, well hydrated  Head:  Normocephalic, atraumatic  Eyes:  Anicteric sclera, pupils equal  Ears:       TMs intact  Oral:     Not examined due to pandemic)  Neck:   Supple, no cervical/supraclavicular adenopathy, no thyromegaly or nodules appreciated on exam  Cor:      Regular rate, normal S1, S2, no murmurs appreciated, no S3, no S4   Lungs:   Clear to auscultation b/l, no wheezes, no rhonchi, no crackles, no accessory respiratory muscle use  Abd:          Soft, nontender, no guarding, no rebound, no hepatosplenomegaly appreciated   Ext:            No lower extremity edema, no palpable cords  Pulses:      Pedal pulses intact  Neuro:   CN2-12 grossly intact (except Funduscopic exam not performed)    Assessment/Plan   Problem List Items Addressed This Visit       Aortic root dilatation (CMS-HCC)     Ascending aorta mildly enlarged dating back to 2014  Cardiology evaluation 2/2022 12/2022 Cardiac echo no significant change  Plan repeat echo in 3-5 yrs          Hypercholesterolemia     Not on statin, has declined in past  Labs ordered         Relevant Orders    Comprehensive Metabolic Panel    Lipid Panel    Lipoprotein a    Hypertension    Relevant Orders    Comprehensive Metabolic Panel    CBC    Mild aortic regurgitation     Mild AR Noted on 12/2020 echo   Cardiology recommended repeat echo 12/2022 12/2022 Cardiac echo no significant change  Repeat echo in 3 yrs         Thyroid nodule     Thyroid Nodule on 2018 US with FNA  Was to get repeat US and follow up with Dr. Barkley   Thyroid US 6/1/2023 with TiRADs 3 nodule Right lobe 1.7x.8x1.6 cm (was 1.4x.7x1.2 cm), continued follow up recommended  Thyroid US ordered         Relevant Orders    Thyroid Stimulating Hormone    Thyroxine, Free    Triiodothyronine, Free    US thyroid (Completed)    Annual physical exam - Primary     -Peviously received annual  influenza vaccine 11/2023  -Previously received 2 Covid vaccines, 2 booster, and bivalent booster in 10/27/2022, and Covid vaccine 11/2023, recommend repeat this spring  -Previously received RSV vaccine 11/2023 had at the [facility] (now closed)  -Previous Pneumovax 8/242016, recommend Prevnar 20  -Recommend Shingrix vaccines  -Previously received Tdap done 2/1/2005, and Td 5/18/15, repeat Tdap 10 years (5/2025)   -Colonoscopy 2009, declined further, Fecal occult blood test 2/21/2022, will need to order Fecal occult blood test  -PSA ordered  -Prior Hep C Screen with Dr. Tobias in 2015  -Labs ordered CMP CBC Fx Lipids Lp(a) PSA TSH Free T4 Free T3         Relevant Orders    Comprehensive Metabolic Panel    CBC    Lipid Panel    Prostate Specific Antigen, Screen    Splenic lesion     Prior surgery evaluation, Stable x 10 yrs, recommended nothing needs to be done         Neck pain    Relevant Medications    gabapentin (Gralise) 600 mg tablet extended release 24 hr    Benign essential hypertension     BP well controlled on current medication         Prostate cancer screening     Discussed guidelines, ordered PSA  Prior Urology evaluation with Prostate MRI 2020 6x4.3x4.3   PSA 1.71 (10/13/2022), had open orders for PSA from urology in 2023 but had not done labs         Relevant Orders    Prostate Specific Antigen, Screen    Family history of coronary artery disease in father     Father had CAD  Will check Lipoprotein (a)         Relevant Orders    Lipid Panel    Lipoprotein a      Krista Campos DO

## 2024-05-14 RX ORDER — GABAPENTIN 600 MG/1
TABLET, FILM COATED ORAL
Qty: 270 TABLET | Refills: 1 | Status: SHIPPED | OUTPATIENT
Start: 2024-05-14

## 2024-05-20 ENCOUNTER — HOSPITAL ENCOUNTER (OUTPATIENT)
Dept: RADIOLOGY | Facility: HOSPITAL | Age: 64
Discharge: HOME | End: 2024-05-20
Payer: COMMERCIAL

## 2024-05-20 DIAGNOSIS — E04.1 THYROID NODULE: ICD-10-CM

## 2024-05-20 PROCEDURE — 76536 US EXAM OF HEAD AND NECK: CPT | Performed by: RADIOLOGY

## 2024-05-20 PROCEDURE — 76536 US EXAM OF HEAD AND NECK: CPT

## 2024-05-21 PROBLEM — S89.92XA KNEE INJURY, LEFT, INITIAL ENCOUNTER: Status: RESOLVED | Noted: 2023-11-08 | Resolved: 2024-05-21

## 2024-05-21 PROBLEM — J98.4 PNEUMONITIS: Status: RESOLVED | Noted: 2023-11-08 | Resolved: 2024-05-21

## 2024-05-21 PROBLEM — Z82.49 FAMILY HISTORY OF CORONARY ARTERY DISEASE IN FATHER: Status: ACTIVE | Noted: 2024-05-21

## 2024-05-21 PROBLEM — Z12.5 PROSTATE CANCER SCREENING: Status: ACTIVE | Noted: 2024-05-21

## 2024-05-21 PROBLEM — S80.02XA CONTUSION, KNEE AND LOWER LEG, LEFT, INITIAL ENCOUNTER: Status: RESOLVED | Noted: 2023-11-08 | Resolved: 2024-05-21

## 2024-05-21 PROBLEM — S01.511A LACERATION OF LIP, INITIAL ENCOUNTER: Status: RESOLVED | Noted: 2023-11-08 | Resolved: 2024-05-21

## 2024-05-21 PROBLEM — S80.12XA CONTUSION, KNEE AND LOWER LEG, LEFT, INITIAL ENCOUNTER: Status: RESOLVED | Noted: 2023-11-08 | Resolved: 2024-05-21

## 2024-05-21 PROBLEM — S01.81XA LACERATION OF CHIN, INITIAL ENCOUNTER: Status: RESOLVED | Noted: 2023-11-08 | Resolved: 2024-05-21

## 2024-05-21 PROBLEM — A48.1: Status: RESOLVED | Noted: 2022-07-26 | Resolved: 2024-05-21

## 2024-05-22 ENCOUNTER — TELEPHONE (OUTPATIENT)
Dept: PRIMARY CARE | Facility: CLINIC | Age: 64
End: 2024-05-22
Payer: COMMERCIAL

## 2024-05-22 DIAGNOSIS — Z12.11 SCREEN FOR COLON CANCER: Primary | ICD-10-CM

## 2024-05-22 NOTE — ASSESSMENT & PLAN NOTE
Thyroid Nodule on 2018 US with FNA  Was to get repeat US and follow up with Dr. Barkley   Thyroid US 6/1/2023 with TiRADs 3 nodule Right lobe 1.7x.8x1.6 cm (was 1.4x.7x1.2 cm), continued follow up recommended  Thyroid US ordered

## 2024-05-22 NOTE — ASSESSMENT & PLAN NOTE
Discussed guidelines, ordered PSA  Prior Urology evaluation with Prostate MRI 2020 6x4.3x4.3   PSA 1.71 (10/13/2022), had open orders for PSA from urology in 2023 but had not done labs

## 2024-05-22 NOTE — ASSESSMENT & PLAN NOTE
Mild AR Noted on 12/2020 echo   Cardiology recommended repeat echo 12/2022 12/2022 Cardiac echo no significant change  Repeat echo in 3 yrs

## 2024-05-22 NOTE — ASSESSMENT & PLAN NOTE
Ascending aorta mildly enlarged dating back to 2014  Cardiology evaluation 2/2022 12/2022 Cardiac echo no significant change  Plan repeat echo in 3-5 yrs

## 2024-05-22 NOTE — ASSESSMENT & PLAN NOTE
-Peviously received annual influenza vaccine 11/2023  -Previously received 2 Covid vaccines, 2 booster, and bivalent booster in 10/27/2022, and Covid vaccine 11/2023, recommend repeat this spring  -Previously received RSV vaccine 11/2023 had at the [facility] (now closed)  -Previous Pneumovax 8/242016, recommend Prevnar 20  -Recommend Shingrix vaccines  -Previously received Tdap done 2/1/2005, and Td 5/18/15, repeat Tdap 10 years (5/2025)   -Colonoscopy 2009, declined further, Fecal occult blood test 2/21/2022, will need to order Fecal occult blood test  -PSA ordered  -Prior Hep C Screen with Dr. Tobias in 2015  -Labs ordered CMP CBC Fx Lipids Lp(a) PSA TSH Free T4 Free T3

## 2024-05-29 ENCOUNTER — CLINICAL SUPPORT (OUTPATIENT)
Dept: AUDIOLOGY | Facility: CLINIC | Age: 64
End: 2024-05-29

## 2024-05-29 DIAGNOSIS — H90.3 ASYMMETRICAL SENSORINEURAL HEARING LOSS: ICD-10-CM

## 2024-05-29 DIAGNOSIS — H90.3 SENSORINEURAL HEARING LOSS, BILATERAL: Primary | ICD-10-CM

## 2024-05-29 ASSESSMENT — PAIN SCALES - GENERAL: PAINLEVEL_OUTOF10: 0 - NO PAIN

## 2024-05-29 ASSESSMENT — PAIN - FUNCTIONAL ASSESSMENT: PAIN_FUNCTIONAL_ASSESSMENT: 0-10

## 2024-05-29 NOTE — PROGRESS NOTES
ADULT HEARING AID CHECK      Name:  Christiano Artis   :  1960   Age:  64 y.o.   Date of Evaluation:  2024     Time: 0071-8317    HISTORY:  Christiano Artis is in for a hearing aid check. He noted that his left hearing aid is not producing any sound. He cannot hear volume adjustments when using the cain and cannot hear any streamed audio in the left hearing aid.     HEARING AIDS:     Hearing Aid Information Right Left    Oticon Oticon   Model Intent 3 miniRITE-R Intent 3 miniRITE-R   Serial Number [id number]   /Tubing 3, 100 dB 3, 85 dB   Dome 10 mm power 10 mm open   Retention No No   Wax Traps ProWax miniFit ProWax miniFit      Repair Warranty 27   Loss and Damage Warranty 27   Fitting Date 2024    Fitting Audiologist Shantel Mathias CCC-A  Clinical Audiologist         Paired to Phone for phone calls: Yes  Paired to smart phone application: Yes  Gain Level: Adaptation 2 RE, 3 LE  Volume controls active: Yes  Fit to Audiogram performed on 23     Programs:  General  Speech in Noise       IMPRESSIONS:  Listening check confirmed the patient's report. With a new  sound quality was restored, including cain adjustments and streamed media. I had to replace his  with a 2 60 dB  instead of 85 dB. I will order two new receivers and size up to a right 3 100 dB  and 3 85 dB  and replace them. The patient did not have any other adjustment preferences at this time.     We will call him when the receivers arrive and schedule a pick-up appointment for him.     He was not billed for today's appointment as he is within the first 90 days of his fitting.        RECOMMENDATIONS:  1.) Follow-up for pick-up appointment.  2.) Continue full-time use of hearing aids.       Shantel Mathias CCC-A  Clinical Audiologist

## 2024-06-07 DIAGNOSIS — I10 PRIMARY HYPERTENSION: ICD-10-CM

## 2024-06-08 RX ORDER — LISINOPRIL 40 MG/1
40 TABLET ORAL DAILY
Qty: 90 TABLET | Refills: 0 | Status: SHIPPED | OUTPATIENT
Start: 2024-06-08

## 2024-06-11 DIAGNOSIS — Z12.11 COLON CANCER SCREENING: Primary | ICD-10-CM

## 2024-06-14 ENCOUNTER — LAB (OUTPATIENT)
Dept: LAB | Facility: LAB | Age: 64
End: 2024-06-14
Payer: COMMERCIAL

## 2024-06-14 DIAGNOSIS — E78.00 HYPERCHOLESTEROLEMIA: ICD-10-CM

## 2024-06-14 DIAGNOSIS — I10 HYPERTENSION, UNSPECIFIED TYPE: ICD-10-CM

## 2024-06-14 DIAGNOSIS — Z00.00 ANNUAL PHYSICAL EXAM: ICD-10-CM

## 2024-06-14 DIAGNOSIS — Z82.49 FAMILY HISTORY OF CORONARY ARTERY DISEASE IN FATHER: ICD-10-CM

## 2024-06-14 DIAGNOSIS — E04.1 THYROID NODULE: ICD-10-CM

## 2024-06-14 DIAGNOSIS — Z12.5 PROSTATE CANCER SCREENING: ICD-10-CM

## 2024-06-14 LAB
ALBUMIN SERPL BCP-MCNC: 4.5 G/DL (ref 3.4–5)
ALP SERPL-CCNC: 117 U/L (ref 33–136)
ALT SERPL W P-5'-P-CCNC: 15 U/L (ref 10–52)
ANION GAP SERPL CALC-SCNC: 13 MMOL/L (ref 10–20)
AST SERPL W P-5'-P-CCNC: 20 U/L (ref 9–39)
BILIRUB SERPL-MCNC: 1.3 MG/DL (ref 0–1.2)
BUN SERPL-MCNC: 24 MG/DL (ref 6–23)
CALCIUM SERPL-MCNC: 9.8 MG/DL (ref 8.6–10.3)
CHLORIDE SERPL-SCNC: 105 MMOL/L (ref 98–107)
CHOLEST SERPL-MCNC: 171 MG/DL (ref 133–200)
CHOLEST/HDLC SERPL: 3.2 {RATIO}
CO2 SERPL-SCNC: 27 MMOL/L (ref 21–32)
CREAT SERPL-MCNC: 0.96 MG/DL (ref 0.5–1.3)
EGFRCR SERPLBLD CKD-EPI 2021: 88 ML/MIN/1.73M*2
ERYTHROCYTE [DISTWIDTH] IN BLOOD BY AUTOMATED COUNT: 13.4 % (ref 11.5–14.5)
GLUCOSE SERPL-MCNC: 98 MG/DL (ref 74–99)
HCT VFR BLD AUTO: 43.9 % (ref 41–52)
HDLC SERPL-MCNC: 54 MG/DL
HGB BLD-MCNC: 14.3 G/DL (ref 13.5–17.5)
LDLC SERPL CALC-MCNC: 91 MG/DL (ref 65–130)
MCH RBC QN AUTO: 28.7 PG (ref 26–34)
MCHC RBC AUTO-ENTMCNC: 32.6 G/DL (ref 32–36)
MCV RBC AUTO: 88 FL (ref 80–100)
NRBC BLD-RTO: NORMAL /100{WBCS}
PLATELET # BLD AUTO: 198 X10*3/UL (ref 150–450)
POTASSIUM SERPL-SCNC: 4.4 MMOL/L (ref 3.5–5.3)
PROT SERPL-MCNC: 7 G/DL (ref 6.4–8.2)
PSA SERPL-MCNC: 1 NG/ML
RBC # BLD AUTO: 4.98 X10*6/UL (ref 4.5–5.9)
SODIUM SERPL-SCNC: 141 MMOL/L (ref 136–145)
T3FREE SERPL-MCNC: 3.5 PG/ML (ref 2.3–4.2)
T4 FREE SERPL-MCNC: 1.2 NG/DL (ref 0.9–1.7)
TRIGL SERPL-MCNC: 129 MG/DL (ref 40–150)
TSH SERPL DL<=0.05 MIU/L-ACNC: 1.01 MIU/L (ref 0.27–4.2)
WBC # BLD AUTO: 5.2 X10*3/UL (ref 4.4–11.3)

## 2024-06-14 PROCEDURE — 36415 COLL VENOUS BLD VENIPUNCTURE: CPT

## 2024-06-14 PROCEDURE — 84481 FREE ASSAY (FT-3): CPT

## 2024-06-14 PROCEDURE — 82172 ASSAY OF APOLIPOPROTEIN: CPT

## 2024-06-14 PROCEDURE — 80061 LIPID PANEL: CPT

## 2024-06-14 PROCEDURE — 84443 ASSAY THYROID STIM HORMONE: CPT

## 2024-06-14 PROCEDURE — 84153 ASSAY OF PSA TOTAL: CPT

## 2024-06-14 PROCEDURE — 85027 COMPLETE CBC AUTOMATED: CPT

## 2024-06-14 PROCEDURE — 80053 COMPREHEN METABOLIC PANEL: CPT

## 2024-06-14 PROCEDURE — 84439 ASSAY OF FREE THYROXINE: CPT

## 2024-06-17 ENCOUNTER — TELEPHONE (OUTPATIENT)
Dept: PRIMARY CARE | Facility: CLINIC | Age: 64
End: 2024-06-17
Payer: COMMERCIAL

## 2024-06-17 LAB — LPA SERPL-MCNC: 22 MG/DL

## 2024-06-18 ENCOUNTER — TELEPHONE (OUTPATIENT)
Dept: PRIMARY CARE | Facility: CLINIC | Age: 64
End: 2024-06-18
Payer: COMMERCIAL

## 2024-07-11 ENCOUNTER — HOSPITAL ENCOUNTER (OUTPATIENT)
Dept: RADIOLOGY | Facility: EXTERNAL LOCATION | Age: 64
Discharge: HOME | End: 2024-07-11

## 2024-07-11 DIAGNOSIS — M25.561 RIGHT KNEE PAIN, UNSPECIFIED CHRONICITY: ICD-10-CM

## 2024-07-12 ENCOUNTER — CLINICAL SUPPORT (OUTPATIENT)
Dept: AUDIOLOGY | Facility: CLINIC | Age: 64
End: 2024-07-12

## 2024-07-12 DIAGNOSIS — H90.3 SENSORINEURAL HEARING LOSS, BILATERAL: Primary | ICD-10-CM

## 2024-07-12 ASSESSMENT — PAIN SCALES - GENERAL: PAINLEVEL_OUTOF10: 0 - NO PAIN

## 2024-07-12 ASSESSMENT — PAIN - FUNCTIONAL ASSESSMENT: PAIN_FUNCTIONAL_ASSESSMENT: 0-10

## 2024-07-15 ENCOUNTER — OFFICE VISIT (OUTPATIENT)
Dept: ORTHOPEDIC SURGERY | Facility: HOSPITAL | Age: 64
End: 2024-07-15
Payer: COMMERCIAL

## 2024-07-15 VITALS — HEIGHT: 73 IN | WEIGHT: 200 LBS | BODY MASS INDEX: 26.51 KG/M2

## 2024-07-15 DIAGNOSIS — M17.11 ARTHRITIS OF RIGHT KNEE: Primary | ICD-10-CM

## 2024-07-15 DIAGNOSIS — M25.461 EFFUSION OF RIGHT KNEE: ICD-10-CM

## 2024-07-15 PROCEDURE — 99204 OFFICE O/P NEW MOD 45 MIN: CPT | Performed by: FAMILY MEDICINE

## 2024-07-15 PROCEDURE — 1036F TOBACCO NON-USER: CPT | Performed by: FAMILY MEDICINE

## 2024-07-15 PROCEDURE — 1159F MED LIST DOCD IN RCRD: CPT | Performed by: FAMILY MEDICINE

## 2024-07-15 PROCEDURE — 1157F ADVNC CARE PLAN IN RCRD: CPT | Performed by: FAMILY MEDICINE

## 2024-07-15 PROCEDURE — 99214 OFFICE O/P EST MOD 30 MIN: CPT | Performed by: FAMILY MEDICINE

## 2024-07-15 PROCEDURE — 1125F AMNT PAIN NOTED PAIN PRSNT: CPT | Performed by: FAMILY MEDICINE

## 2024-07-15 RX ORDER — NAPROXEN 500 MG/1
500 TABLET ORAL
Qty: 30 TABLET | Refills: 0 | Status: SHIPPED | OUTPATIENT
Start: 2024-07-15

## 2024-07-15 ASSESSMENT — PAIN - FUNCTIONAL ASSESSMENT: PAIN_FUNCTIONAL_ASSESSMENT: 0-10

## 2024-07-15 ASSESSMENT — PAIN SCALES - GENERAL: PAINLEVEL_OUTOF10: 7

## 2024-07-15 NOTE — PROGRESS NOTES
ADULT HEARING AID CHECK      Name:  Christiano Artis   :  1960   Age:  64 y.o.   Date of Evaluation:  2024     Time: 8611-1174    HISTORY:  Christiano Artis is in to replace his current receivers with longer receivers. He is now coupled with a 3 100 dB  (RE) and 3 85 dB  (LE). He does not want any other adjustments made at this time and is doing well overall.       HEARING AIDS:    Hearing Aid Information Right Left    Oticon Oticon   Model Intent 3 miniRITE-R Intent 3 miniRITE-R   Serial Number [id number]   /Tubing 3, 100 dB 3, 85 dB   Dome 10 mm power 10 mm open   Retention No No   Wax Traps ProWax miniFit ProWax miniFit      Repair Warranty 27   Loss and Damage Warranty 27   Fitting Date 2024    Fitting Audiologist Shantel Mathias CCC-A  Clinical Audiologist         Paired to Phone for phone calls: Yes  Paired to smart phone application: Yes  Gain Level: Adaptation 2 RE, 3 LE  Volume controls active: Yes  Fit to Audiogram performed on 23     Programs:  General  Speech in Noise       IMPRESSIONS:  Christiano is doing very well with his hearing aids. He should follow-up for annual hearing aid checks and audiologic assessments. He can return sooner, if needed.     He was not charged for today's appointment as he is still within his first 90 days of fitting.      RECOMMENDATIONS:  1.) Full-time use of hearing aids  2.) Return for annual audiologic assessment and hearing aid check or sooner if needed.       Shantel Mathias CCC-A  Clinical Audiologist

## 2024-07-15 NOTE — PROGRESS NOTES
** Please excuse any errors in grammar or translation related to this dictation. Voice recognition software was utilized to prepare this document. **    Assessment & Plan:  Presentation consistent with acute exacerbation of right knee arthritis.  Reassured patient that no MRI is needed at this time as he has no ligamentous laxity with intact extensor mechanism.  To address the current symptoms recommend starting on Naprosyn twice daily instead of intermittent ibuprofen use.  Can use Tylenol in conjunction with this but no other NSAIDs.  Can utilize ice or heat as needed for comfort.  Discussed with patient that a steroid injection may help to further mitigate these symptoms, if needed.  Patient will follow-up in the next few weeks for reassessment.   All questions answered and patient agreeable to plan of care.     Chief complaint:  Right knee pain    HPI:  64-year-old male presents to the Ortho injury clinic with right knee pain and swelling x 2 weeks.  No trauma reported.  He states the day before the onset of his pain he went on 8 mile hike and completed yard work afterward.  He is an avid hiker and states the distance he hiked is pretty typical for him.  Intermittently use of ibuprofen, compression wrap, and ice to manage his symptoms.  No previous knee surgery reported.  No mechanical symptoms.  Went to an urgent care and had x-rays performed on 7/11/2024.      Patient Active Problem List   Diagnosis    Allergic rhinitis    Anemia    Aortic root dilatation (CMS-HCC)    BPH with obstruction/lower urinary tract symptoms    Calculus of kidney    Congenital spondylolisthesis    Elevated alkaline phosphatase level    Elevated PSA    Hearing loss in right ear    Hypercholesterolemia    Hypertension    Injury of jaw    Laryngopharyngeal reflux (LPR)    Mild aortic regurgitation    Nasal septal deviation    Thyroid nodule    Total bilirubin, elevated    Annual physical exam    Subjective tinnitus, right    Splenic  lesion    Spinal stenosis of lumbar region with radiculopathy    Lumbosacral spinal stenosis    Lumbar radiculopathy, right    Sick sinus syndrome (Multi)    Sensorineural hearing loss, bilateral    Sciatic pain    Right wrist pain    Rib fracture    Posterior vitreous detachment of both eyes    Painful legs and moving toes of left foot    Bilateral leg and foot pain    Pain of toe of left foot    Pain of back and right lower extremity    Low back pain    Overweight    Optic disc cupping    Open angle with borderline glaucoma findings, bilateral    Nuclear sclerosis of both eyes    Neck pain    Nasal turbinate hypertrophy    Nasal obstruction    Leg cramps    Hyperopia of both eyes    Epistaxis    Prostatitis    Epididymitis, right    Acute prostatitis    Dysuria    De Quervain's tenosynovitis, right    Benign essential hypertension    Arthritis of left foot    Anxiety due to invasive procedure    Acquired plantar porokeratosis    Personal history of urinary calculi    Nephrolithiasis    Prostate cancer screening    Family history of coronary artery disease in father     Past Surgical History:   Procedure Laterality Date    ABLATION OF DYSRHYTHMIC FOCUS  03/04/2015    Catheter Ablation    CT HEAD ANGIO W AND WO IV CONTRAST  10/9/2017    CT HEAD ANGIO W AND WO IV CONTRAST 10/9/2017 [facility]    LUMBAR FUSION  08/28/2016    Lumbar Vertebral Fusion    MOUTH SURGERY  11/05/2012    Oral Surgery Tooth Extraction    OTHER SURGICAL HISTORY  08/28/2016    Laminectomy Decompressive More Than Two Lumbar Segments    OTHER SURGICAL HISTORY  08/28/2016    Spinal Surgery - Allograft    OTHER SURGICAL HISTORY  10/13/2022    Back surgery    OTHER SURGICAL HISTORY  01/10/2014    Cystoscopy With Ureteroscopy With Lithotripsy    OTHER SURGICAL HISTORY  10/21/2020    Orthopedic Surgery    OTHER SURGICAL HISTORY  12/21/2013    Cystoscopy With Insertion Of Ureteral Stent Right    VASECTOMY  11/05/2012    Surgery Vas Deferens  Vasectomy     Current Outpatient Medications on File Prior to Visit   Medication Sig Dispense Refill    gabapentin (Gralise) 600 mg tablet extended release 24 hr Take 3 capsules (1,800mg) by mouth daily with dinner 270 tablet 1    lisinopril 40 mg tablet Take 1 tablet (40 mg) by mouth once daily. 90 tablet 0    multivitamin tablet Take 1 tablet by mouth once daily.       No current facility-administered medications on file prior to visit.       Exam:  RIGHT Knee examined.  Moderate effusion. No Ecchymosis, warmth or erythema.  AROM from 0 to 110 deg with 5/5 strength. SILT overlying knee.  No motion crepitus.  No joint line tenderness.  No popliteal mass palpated. Negative anterior and posterior drawer.  No laxity to varus or valgus stress at 0 or 30 deg.  No patellar apprehension.  [ -] Joie    Results:  X-rays right knee obtained 7/11/24 personally interpreted as no acute fracture with normal alignment.  No significant degenerative changes.    Lab Results   Component Value Date    CREATININE 0.96 06/14/2024    EGFR 88 06/14/2024

## 2024-08-22 ENCOUNTER — APPOINTMENT (OUTPATIENT)
Dept: ORTHOPEDIC SURGERY | Facility: HOSPITAL | Age: 64
End: 2024-08-22
Payer: COMMERCIAL

## 2024-08-29 ENCOUNTER — OFFICE VISIT (OUTPATIENT)
Dept: ORTHOPEDIC SURGERY | Facility: HOSPITAL | Age: 64
End: 2024-08-29
Payer: COMMERCIAL

## 2024-08-29 DIAGNOSIS — M17.11 ARTHRITIS OF RIGHT KNEE: Primary | ICD-10-CM

## 2024-08-29 PROCEDURE — 1157F ADVNC CARE PLAN IN RCRD: CPT | Performed by: FAMILY MEDICINE

## 2024-08-29 PROCEDURE — 99213 OFFICE O/P EST LOW 20 MIN: CPT | Performed by: FAMILY MEDICINE

## 2024-08-29 PROCEDURE — 1159F MED LIST DOCD IN RCRD: CPT | Performed by: FAMILY MEDICINE

## 2024-08-29 PROCEDURE — 1036F TOBACCO NON-USER: CPT | Performed by: FAMILY MEDICINE

## 2024-08-29 NOTE — PROGRESS NOTES
** Please excuse any errors in grammar or translation related to this dictation. Voice recognition software was utilized to prepare this document. **    Assessment & Plan:  Follow up for right knee pain.  Condition improved with scheduled use of Naprosyn x 2 weeks.  Reiterated to patient the initial presentation most consistent with arthritis flare.  Again discussed how the symptoms may wax and wane over time.  Initial treatment with modalities should be ice, heating pad, oral anti-inflammatory medication such as naproxen or ibuprofen.  If conservative measures fail to improve condition can follow-up for reassessment and consideration of injection at that time.  All questions answered and patient agrees with plan of care.    Chief complaint:  Right knee pain    HPI:  8/29/24:  Patient reports his knee has improved since the previous visit.  The swelling has dissipated.  He still has occasional mild ache on the medial knee.  Denies any new injuries.  He completed 2 weeks of Naprosyn and  has not been taking any other nsaids since.      7/15/24: 64-year-old male presents to the Ortho injury clinic with right knee pain and swelling x 2 weeks.  No trauma reported.  He states the day before the onset of his pain he went on 8 mile hike and completed yard work afterward.  He is an avid hiker and states the distance he hiked is pretty typical for him.  Intermittently use of ibuprofen, compression wrap, and ice to manage his symptoms.  No previous knee surgery reported.  No mechanical symptoms.  Went to an urgent care and had x-rays performed on 7/11/2024.      Patient Active Problem List   Diagnosis    Allergic rhinitis    Anemia    Aortic root dilatation (CMS-HCC)    BPH with obstruction/lower urinary tract symptoms    Calculus of kidney    Congenital spondylolisthesis    Elevated alkaline phosphatase level    Elevated PSA    Hearing loss in right ear    Hypercholesterolemia    Hypertension    Injury of jaw     Laryngopharyngeal reflux (LPR)    Mild aortic regurgitation    Nasal septal deviation    Thyroid nodule    Total bilirubin, elevated    Annual physical exam    Subjective tinnitus, right    Splenic lesion    Spinal stenosis of lumbar region with radiculopathy    Lumbosacral spinal stenosis    Lumbar radiculopathy, right    Sick sinus syndrome (Multi)    Sensorineural hearing loss, bilateral    Sciatic pain    Right wrist pain    Rib fracture    Posterior vitreous detachment of both eyes    Painful legs and moving toes of left foot    Bilateral leg and foot pain    Pain of toe of left foot    Pain of back and right lower extremity    Low back pain    Overweight    Optic disc cupping    Open angle with borderline glaucoma findings, bilateral    Nuclear sclerosis of both eyes    Neck pain    Nasal turbinate hypertrophy    Nasal obstruction    Leg cramps    Hyperopia of both eyes    Epistaxis    Prostatitis    Epididymitis, right    Acute prostatitis    Dysuria    De Quervain's tenosynovitis, right    Benign essential hypertension    Arthritis of left foot    Anxiety due to invasive procedure    Acquired plantar porokeratosis    Personal history of urinary calculi    Nephrolithiasis    Prostate cancer screening    Family history of coronary artery disease in father     Past Surgical History:   Procedure Laterality Date    ABLATION OF DYSRHYTHMIC FOCUS  03/04/2015    Catheter Ablation    CT HEAD ANGIO W AND WO IV CONTRAST  10/9/2017    CT HEAD ANGIO W AND WO IV CONTRAST 10/9/2017 [facility]    LUMBAR FUSION  08/28/2016    Lumbar Vertebral Fusion    MOUTH SURGERY  11/05/2012    Oral Surgery Tooth Extraction    OTHER SURGICAL HISTORY  08/28/2016    Laminectomy Decompressive More Than Two Lumbar Segments    OTHER SURGICAL HISTORY  08/28/2016    Spinal Surgery - Allograft    OTHER SURGICAL HISTORY  10/13/2022    Back surgery    OTHER SURGICAL HISTORY  01/10/2014    Cystoscopy With Ureteroscopy With Lithotripsy     OTHER SURGICAL HISTORY  10/21/2020    Orthopedic Surgery    OTHER SURGICAL HISTORY  12/21/2013    Cystoscopy With Insertion Of Ureteral Stent Right    VASECTOMY  11/05/2012    Surgery Vas Deferens Vasectomy     Current Outpatient Medications on File Prior to Visit   Medication Sig Dispense Refill    gabapentin (Gralise) 600 mg tablet extended release 24 hr Take 3 capsules (1,800mg) by mouth daily with dinner 270 tablet 1    lisinopril 40 mg tablet Take 1 tablet (40 mg) by mouth once daily. 90 tablet 0    multivitamin tablet Take 1 tablet by mouth once daily.      naproxen (Naprosyn) 500 mg tablet Take 1 tablet (500 mg) by mouth 2 times daily (morning and late afternoon). 30 tablet 0     No current facility-administered medications on file prior to visit.       Exam:  RIGHT Knee examined.  No effusion. No ecchymosis, warmth or erythema.  AROM from 0 to 120 deg with 5/5 strength. SILT overlying knee.  No motion crepitus.  No joint line tenderness.  No popliteal mass palpated. Negative anterior and posterior drawer.  No laxity to varus or valgus stress at 0 or 30 deg.  No patellar apprehension.  [ -] Joie, [-] Thessaly    General Exam:  Constitutional - NAD, AAO x 3, conversing appropriately.  HEENT- Normocephalic and atraumatic. No facial deformities. Hearing grossly normal.  Lungs - Breathing non-labored with normal rate. No accessory muscle use.  CV - Extremities warm and well-perfused, brisk capillary refill present.   Neuro - CN II-XII grossly intact.    Results:  X-rays right knee  7/11/24: mild medial compartment degenerative change.    Lab Results   Component Value Date    CREATININE 0.96 06/14/2024    EGFR 88 06/14/2024

## 2024-09-05 DIAGNOSIS — I10 PRIMARY HYPERTENSION: ICD-10-CM

## 2024-09-05 RX ORDER — LISINOPRIL 40 MG/1
40 TABLET ORAL DAILY
Qty: 90 TABLET | Refills: 2 | Status: SHIPPED | OUTPATIENT
Start: 2024-09-05

## 2024-11-02 DIAGNOSIS — M54.2 NECK PAIN: ICD-10-CM

## 2024-11-05 RX ORDER — GABAPENTIN 600 MG/1
TABLET, FILM COATED ORAL
Qty: 90 TABLET | Refills: 2 | Status: SHIPPED | OUTPATIENT
Start: 2024-11-05

## 2024-11-11 ENCOUNTER — APPOINTMENT (OUTPATIENT)
Dept: AUDIOLOGY | Facility: CLINIC | Age: 64
End: 2024-11-11
Payer: COMMERCIAL

## 2024-11-18 ENCOUNTER — APPOINTMENT (OUTPATIENT)
Dept: UROLOGY | Facility: CLINIC | Age: 64
End: 2024-11-18
Payer: COMMERCIAL

## 2024-11-18 ENCOUNTER — CLINICAL SUPPORT (OUTPATIENT)
Dept: AUDIOLOGY | Facility: CLINIC | Age: 64
End: 2024-11-18
Payer: COMMERCIAL

## 2024-11-18 VITALS — WEIGHT: 205 LBS | TEMPERATURE: 96.7 F | HEIGHT: 73 IN | BODY MASS INDEX: 27.17 KG/M2

## 2024-11-18 DIAGNOSIS — H90.3 BILATERAL SENSORINEURAL HEARING LOSS: Primary | ICD-10-CM

## 2024-11-18 DIAGNOSIS — N20.0 CALCULUS OF KIDNEY: ICD-10-CM

## 2024-11-18 DIAGNOSIS — N40.1 BPH WITH OBSTRUCTION/LOWER URINARY TRACT SYMPTOMS: Primary | ICD-10-CM

## 2024-11-18 DIAGNOSIS — N13.8 BPH WITH OBSTRUCTION/LOWER URINARY TRACT SYMPTOMS: Primary | ICD-10-CM

## 2024-11-18 PROCEDURE — 51798 US URINE CAPACITY MEASURE: CPT | Performed by: UROLOGY

## 2024-11-18 PROCEDURE — 1126F AMNT PAIN NOTED NONE PRSNT: CPT | Performed by: UROLOGY

## 2024-11-18 PROCEDURE — 92567 TYMPANOMETRY: CPT

## 2024-11-18 PROCEDURE — 51741 ELECTRO-UROFLOWMETRY FIRST: CPT | Performed by: UROLOGY

## 2024-11-18 PROCEDURE — 1157F ADVNC CARE PLAN IN RCRD: CPT | Performed by: UROLOGY

## 2024-11-18 PROCEDURE — 3008F BODY MASS INDEX DOCD: CPT | Performed by: UROLOGY

## 2024-11-18 PROCEDURE — 92557 COMPREHENSIVE HEARING TEST: CPT

## 2024-11-18 PROCEDURE — 1159F MED LIST DOCD IN RCRD: CPT | Performed by: UROLOGY

## 2024-11-18 PROCEDURE — 99214 OFFICE O/P EST MOD 30 MIN: CPT | Performed by: UROLOGY

## 2024-11-18 ASSESSMENT — PAIN - FUNCTIONAL ASSESSMENT: PAIN_FUNCTIONAL_ASSESSMENT: 0-10

## 2024-11-18 ASSESSMENT — PAIN SCALES - GENERAL
PAINLEVEL_OUTOF10: 0-NO PAIN
PAINLEVEL_OUTOF10: 0 - NO PAIN

## 2024-11-18 NOTE — PROGRESS NOTES
"  Patient is a 64 y.o. male bph w LUTS   Chief Complaint    bph w LUTS        Referring physician: No ref. provider found     SUBJECTIVE:  HPI   64 y.o. male with history of BPH with LUTS s/p HoLEP on 8/3/2023, presents today for a follow up visit.  During last visit he had persistent postvoid dribbling.    He underwent cystoscopy during last visit.  That showed no residual adenoma and no evidence of stricture.    Therefore he was diagnosed with pelvic floor dysfunction and referred to pelvic floor PT      He reports resolution of all bothersome urinary symptoms.  Postvoid dribbling has improved significantly.    Today:    Uroflow showed voided volume of 56 mL with a peak flow rate of 5 mL/s.    IPSS is 5 and 1    No results found for: \"KPSAT\", \"KPSAP\"  No results found for: \"UAMICCOMM\"  Microscopic (no units)   Date Value   07/20/2023 MANUAL MICROSCOPIC URINES     Urine Culture (no units)   Date Value   09/14/2023 NO GROWTH        Past Medical History:   Diagnosis Date    Calculus of kidney 10/01/2018    Calculus of kidney    Calculus of kidney 10/01/2018    Calculus of kidney    Cellulitis, unspecified 01/04/2014    Cellulitis    Contusion, knee and lower leg, left, initial encounter 11/08/2023    Encounter for immunization 05/18/2015    Need for Td vaccine    Encounter for immunization 02/26/2015    Need for immunization against typhoid    Encounter for immunization 11/13/2018    Need for influenza vaccination    Encounter for screening for other viral diseases 05/18/2015    Need for hepatitis C screening test    Fever, unspecified 05/27/2020    Fever and chills    Knee injury, left, initial encounter 11/08/2023    Laceration of chin, initial encounter 11/08/2023    Laceration of lip, initial encounter 11/08/2023    Legionella pneumonia (Multi) 05/12/2023    Legionnaire's disease (Multi) 07/26/2022    Other conditions influencing health status 08/06/2012    Infected Nonvenomous Insect Bite On Trunk    " Other specified disorders of right ear 11/28/2016    Blocked ear, right    Personal history of other diseases of the circulatory system 04/12/2016    History of paroxysmal supraventricular tachycardia    Personal history of other diseases of the digestive system 11/23/2012    History of cholelithiasis    Personal history of other diseases of the nervous system and sense organs 04/09/2014    History of amaurosis fugax    Personal history of other diseases of the respiratory system 12/10/2018    History of acute sinusitis    Personal history of other drug therapy 02/26/2015    History of hepatitis A vaccination    Personal history of other infectious and parasitic diseases     History of varicella    Personal history of other specified conditions 05/27/2020    History of dysuria    Pneumonitis 11/08/2023    Prostatic intraepithelial neoplasia     High grade prostatic intraepithelial neoplasia    Unspecified acute conjunctivitis, right eye 12/10/2018    Acute bacterial conjunctivitis of right eye        Past medical, surgical, family and social history in the chart was reviewed and is accurate including any additions to what is in this HPI.    Review of Systems   Constitutional: denies any unintentional weight loss or change in strength.  Integumentary: denies any rashes or pruritus.  Eyes: denies any double vision or eye pain.  Ear/Nose/Mouth/Throat: denies any nosebleeds or gum bleeds.  Cardiovascular: denies any chest pain or syncope.  Respiratory: denies hemoptysis.  Gastrointestinal: denies nausea or vomiting.  Musculoskeletal: denies muscle cramping or weakness.  Neurologic: denies convulsions or seizures.  Hematologic/Lymphatic: denies bleeding tendencies.  Endocrine: denies heat/cold intolerance.  All other systems have been reviewed and are negative unless otherwise noted in the HPI.    OBJECTIVE:  Visit Vitals  Temp 35.9 °C (96.7 °F)     Physical Exam   Constitutional: No obvious distress.  Eyes: Non-injected  "conjunctiva, sclera clear, EOMI.  Ears/Nose/Mouth/Throat: No obvious drainage per ears or nose.  Cardiovascular: Extremities are warm and well perfused. No edema, cyanosis or pallor.  Respiratory: No audible wheezing/stridor; respirations do not appear labored.  Gastrointestinal: Abdomen soft, not distended.  Musculoskeletal: Normal ROM of extremities.  Skin: No obvious rashes or open sores.  Neurologic: Alert and oriented, CN 2-12 grossly intact.  Psychiatric: Answers questions appropriately with normal affect.  Hematologic/Lymphatic/Immunologic: No obvious bruises or sites of spontaneous bleeding.  Genitourinary: No CVA tenderness, bladder not palpable.     Labs:  Lab Results   Component Value Date    WBC 5.2 06/14/2024    HGB 14.3 06/14/2024    HCT 43.9 06/14/2024     06/14/2024    CHOL 171 06/14/2024    TRIG 129 06/14/2024    HDL 54.0 06/14/2024    ALT 15 06/14/2024    AST 20 06/14/2024     06/14/2024    K 4.4 06/14/2024     06/14/2024    CREATININE 0.96 06/14/2024    BUN 24 (H) 06/14/2024    CO2 27 06/14/2024    TSH 1.01 06/14/2024    PSA 1.71 10/13/2022    INR 1.0 07/20/2023     No results found for: \"KPSAT\", \"KPSAP\"  ASSESSMENT:  Problem List Items Addressed This Visit       BPH with obstruction/lower urinary tract symptoms - Primary    Relevant Orders    Measure post void residual (Completed)      PLAN:  1.  BPH with lower urinary tract symptoms status post HoLEP.  PSA is low at 1.  All bothersome symptoms have resolved after course of pelvic floor physical therapy.  Will continue to monitor.    2.  History of nephrolithiasis.  He does have known nonobstructing stones.  Will repeat CT stone protocol and follow-up virtually.    All questions were answered to the patient’s satisfaction.  Patient agrees with the plan and wishes to proceed.  Follow-up will be scheduled appropriately.     Sheri Zarate MD  "

## 2024-11-22 NOTE — PROGRESS NOTES
AUDIOLOGIC EVALUATION      Name:  Christiano Artis  :  1960  Age:  64 y.o.  Date of Evaluation:  2024    Time: 5760-7994    HISTORY  Chrisitano Artis, 64 y.o., was seen for an annual audiologic assessment. He has a history of asymmetric sensorineural hearing loss, right ear poorer. He wears bilateral Oticon hearing aids. He feels that his hearing has remained stable in both ears. Recall an MRI-IAC in 2017 showed no masses in the CPA or IACs. He's had right tinnitus previously. He denied otalgia, otorrhea, dizziness, aural pressure/fullness, and history of otologic surgeries.      RESULTS:  Otoscopic Evaluation:  Right Ear: Unremarkable  Left Ear: Unremarkable    Immittance Measures:  Right Ear: Type A -- indicating normal volume, pressure, and static compliance  Left Ear: Type A -- indicating normal volume, pressure, and static compliance    Acoustic Reflexes:  Right Ear: Did not test.  Left Ear: Did not test.    Pure Tone Audiometry:    Right Ear: Moderate sloping to profound sensorineural hearing loss  Left Ear: Hearing within normal limits sloping to moderate sensorineural hearing loss    Asymmetry: Yes, right ear poorer 125-8000 Hz    In comparison to previous audio completed on 23, his hearing has remained stable in both ears.      Reliability:   Good    Speech Audiometry:   Right: Speech Reception Threshold (SRT) was obtained at 65 dBHL w/ masking.   SRT/PTA in Good agreement with pure tone average.    Left: Speech Reception Threshold (SRT) was obtained at 25 dBHL.   SRT/PTA in Good agreement with pure tone average.    Word Recognition Scores (WRS):  Right Ear: poor (58%) in quiet when words were presented at 85 dBHL w/ masking.   Left Ear: excellent (92%) in quiet when words were presented at 65 dBHL.     Asymmetry: Yes, right ear poorer      HEARING AIDS:    Hearing Aid Information Right Left    Oticon Oticon   Model Intent 3 miniRITE-R Intent 3 miniRITE-R   Serial Number [id number]  [id number]   /Tubing 3, 100 dB 3, 85 dB   Dome 10 mm power 10 mm open   Retention No No   Wax Traps ProWax miniFit ProWax miniFit      Repair Warranty 4/26/27   Loss and Damage Warranty 4/26/27   Fitting Date 4/26/2024    Fitting Audiologist Shantel Mathias, CCC-A  Clinical Audiologist         Paired to Phone for phone calls: Yes  Paired to smart phone application: Yes  Gain Level: Adaptation 2 RE, 3 LE  Volume controls active: Yes  Fit to Audiogram performed on 11/9/23     Programs:  General  Speech in Noise       IMPRESSIONS:  Today's testing revealed normal ear canal volume, middle ear pressure, and tympanic membrane compliance in both ears. He has moderate sloping to profound sensorineural hearing loss in the right ear and hearing within normal limits sloping to moderate sensorineural hearing loss in the left ear. He has a noted asymmetry, right ear poorer 125-8000 Hz. In comparison to previous audio completed on 11/9/23, his hearing has remained stable in both ears. He has poor word recognition in his right ear (58%) and excellent word recognition in his left ear (92%). His hearing aids did not need to be re-programmed today, but they were both cleaned and checked. The results were discussed with the patient. He should follow-up for hearing aid checks as needed and annual audiologic assessments.       RECOMMENDATIONS:  1.) Return for audiologic evaluation annually to monitor hearing sensitivity and assess middle ear status or sooner should concerns arise. The audiology department can be reached at [phone] to schedule an appointment.   2.) Strive for full-time device use during waking hours, except when activities preclude device safety.  3.) Return as needed for hearing aid checks.       Shantel Mathias, CCC-A  Clinical Audiologist        KEY  SNHL Sensorineural Hearing Loss   CHL Conductive Hearing Loss   MHL Mixed Hearing Loss   SSNHL Sudden Sensorineural Hearing Loss   WNL Within  Normal Limits   PTA Pure Tone Average   TM Tympanic Membrane   ECV Ear Canal Volume   SRT Speech Reception Threshold   WRS Word Recognition Score

## 2024-12-03 ENCOUNTER — HOSPITAL ENCOUNTER (OUTPATIENT)
Dept: RADIOLOGY | Facility: HOSPITAL | Age: 64
Discharge: HOME | End: 2024-12-03
Payer: COMMERCIAL

## 2024-12-03 DIAGNOSIS — N20.0 CALCULUS OF KIDNEY: ICD-10-CM

## 2024-12-03 PROCEDURE — 74176 CT ABD & PELVIS W/O CONTRAST: CPT | Performed by: RADIOLOGY

## 2024-12-03 PROCEDURE — 74176 CT ABD & PELVIS W/O CONTRAST: CPT

## 2025-01-01 ENCOUNTER — OFFICE VISIT (OUTPATIENT)
Dept: URGENT CARE | Age: 65
End: 2025-01-01
Payer: COMMERCIAL

## 2025-01-01 VITALS
OXYGEN SATURATION: 96 % | BODY MASS INDEX: 27.05 KG/M2 | SYSTOLIC BLOOD PRESSURE: 141 MMHG | DIASTOLIC BLOOD PRESSURE: 95 MMHG | TEMPERATURE: 97.6 F | WEIGHT: 205 LBS | HEART RATE: 68 BPM | RESPIRATION RATE: 16 BRPM

## 2025-01-01 DIAGNOSIS — R05.2 SUBACUTE COUGH: Primary | ICD-10-CM

## 2025-01-01 DIAGNOSIS — J01.10 ACUTE NON-RECURRENT FRONTAL SINUSITIS: ICD-10-CM

## 2025-01-01 LAB
POC RAPID INFLUENZA A: NEGATIVE
POC RAPID INFLUENZA B: NEGATIVE

## 2025-01-01 RX ORDER — BENZONATATE 200 MG/1
200 CAPSULE ORAL 3 TIMES DAILY PRN
Qty: 21 CAPSULE | Refills: 0 | Status: SHIPPED | OUTPATIENT
Start: 2025-01-01 | End: 2025-01-08

## 2025-01-01 RX ORDER — AMOXICILLIN AND CLAVULANATE POTASSIUM 875; 125 MG/1; MG/1
875 TABLET, FILM COATED ORAL 2 TIMES DAILY
Qty: 20 TABLET | Refills: 0 | Status: SHIPPED | OUTPATIENT
Start: 2025-01-01 | End: 2025-01-08

## 2025-01-01 ASSESSMENT — ENCOUNTER SYMPTOMS
RHINORRHEA: 1
SINUS PRESSURE: 1
SINUS COMPLAINT: 1
COUGH: 1

## 2025-01-01 NOTE — PROGRESS NOTES
Subjective   Patient ID: Christiano Artis is a 64 y.o. male. They present today with a chief complaint of Nasal Congestion, Sinus Problem, and Cough (Patient 10 days with sinus congestion).    History of Present Illness    Sinus Problem  Associated symptoms: congestion, cough and rhinorrhea    Cough  Associated symptoms include postnasal drip and rhinorrhea.       Past Medical History  Allergies as of 01/01/2025 - Reviewed 01/01/2025   Allergen Reaction Noted    Azithromycin Unknown 06/14/2023    Sulfamethoxazole-trimethoprim Unknown 11/08/2023    Ciprofloxacin Rash 05/12/2023    Erythromycin Rash 05/12/2023    Tetracyclines Rash 05/12/2023       (Not in a hospital admission)       Past Medical History:   Diagnosis Date    Calculus of kidney 10/01/2018    Calculus of kidney    Calculus of kidney 10/01/2018    Calculus of kidney    Cellulitis, unspecified 01/04/2014    Cellulitis    Contusion, knee and lower leg, left, initial encounter 11/08/2023    Encounter for immunization 05/18/2015    Need for Td vaccine    Encounter for immunization 02/26/2015    Need for immunization against typhoid    Encounter for immunization 11/13/2018    Need for influenza vaccination    Encounter for screening for other viral diseases 05/18/2015    Need for hepatitis C screening test    Fever, unspecified 05/27/2020    Fever and chills    Knee injury, left, initial encounter 11/08/2023    Laceration of chin, initial encounter 11/08/2023    Laceration of lip, initial encounter 11/08/2023    Legionella pneumonia (Multi) 05/12/2023    Legionnaire's disease (Multi) 07/26/2022    Other conditions influencing health status 08/06/2012    Infected Nonvenomous Insect Bite On Trunk    Other specified disorders of right ear 11/28/2016    Blocked ear, right    Personal history of other diseases of the circulatory system 04/12/2016    History of paroxysmal supraventricular tachycardia    Personal history of other diseases of the digestive system  11/23/2012    History of cholelithiasis    Personal history of other diseases of the nervous system and sense organs 04/09/2014    History of amaurosis fugax    Personal history of other diseases of the respiratory system 12/10/2018    History of acute sinusitis    Personal history of other drug therapy 02/26/2015    History of hepatitis A vaccination    Personal history of other infectious and parasitic diseases     History of varicella    Personal history of other specified conditions 05/27/2020    History of dysuria    Pneumonitis 11/08/2023    Prostatic intraepithelial neoplasia     High grade prostatic intraepithelial neoplasia    Unspecified acute conjunctivitis, right eye 12/10/2018    Acute bacterial conjunctivitis of right eye       Past Surgical History:   Procedure Laterality Date    ABLATION OF DYSRHYTHMIC FOCUS  03/04/2015    Catheter Ablation    CT HEAD ANGIO W AND WO IV CONTRAST  10/9/2017    CT HEAD ANGIO W AND WO IV CONTRAST 10/9/2017 [facility]    LUMBAR FUSION  08/28/2016    Lumbar Vertebral Fusion    MOUTH SURGERY  11/05/2012    Oral Surgery Tooth Extraction    OTHER SURGICAL HISTORY  08/28/2016    Laminectomy Decompressive More Than Two Lumbar Segments    OTHER SURGICAL HISTORY  08/28/2016    Spinal Surgery - Allograft    OTHER SURGICAL HISTORY  10/13/2022    Back surgery    OTHER SURGICAL HISTORY  01/10/2014    Cystoscopy With Ureteroscopy With Lithotripsy    OTHER SURGICAL HISTORY  10/21/2020    Orthopedic Surgery    OTHER SURGICAL HISTORY  12/21/2013    Cystoscopy With Insertion Of Ureteral Stent Right    VASECTOMY  11/05/2012    Surgery Vas Deferens Vasectomy        reports that he has never smoked. He has never used smokeless tobacco. He reports current alcohol use of about 1.0 standard drink of alcohol per week. He reports that he does not use drugs.    Review of Systems  Review of Systems   HENT:  Positive for congestion, postnasal drip, rhinorrhea and sinus pressure.     Respiratory:  Positive for cough.    All other systems reviewed and are negative.                                 Objective    Vitals:    01/01/25 0821   BP: (!) 141/95   Pulse: 68   Resp: 16   Temp: 36.4 °C (97.6 °F)   TempSrc: Oral   SpO2: 96%   Weight: 93 kg (205 lb)     No LMP for male patient.    Physical Exam  Vitals and nursing note reviewed.   Constitutional:       Appearance: Normal appearance.   HENT:      Head: Normocephalic.      Right Ear: Ear canal and external ear normal. Tympanic membrane is bulging.      Left Ear: Tympanic membrane, ear canal and external ear normal.      Nose: Congestion and rhinorrhea present. Rhinorrhea is purulent.      Right Turbinates: Enlarged.      Left Turbinates: Enlarged.      Right Sinus: Frontal sinus tenderness present.      Left Sinus: Frontal sinus tenderness present.      Mouth/Throat:      Mouth: Mucous membranes are moist.      Pharynx: Oropharynx is clear.   Eyes:      Extraocular Movements: Extraocular movements intact.      Pupils: Pupils are equal, round, and reactive to light.   Cardiovascular:      Rate and Rhythm: Normal rate and regular rhythm.      Pulses: Normal pulses.      Heart sounds: Normal heart sounds.   Pulmonary:      Effort: Pulmonary effort is normal.      Breath sounds: Normal breath sounds.   Musculoskeletal:         General: Normal range of motion.      Cervical back: Normal range of motion and neck supple.   Skin:     General: Skin is warm and dry.   Neurological:      General: No focal deficit present.      Mental Status: He is alert and oriented to person, place, and time.   Psychiatric:         Mood and Affect: Mood normal.         Behavior: Behavior normal.         Procedures    Point of Care Test & Imaging Results from this visit  Results for orders placed or performed in visit on 01/01/25   POCT Influenza A/B manually resulted   Result Value Ref Range    POC Rapid Influenza A Negative Negative    POC Rapid Influenza B Negative  Negative      No results found.    Diagnostic study results (if any) were reviewed by [facility].    Assessment/Plan   Allergies, medications, history, and pertinent labs/EKGs/Imaging reviewed by JAZMIN Yanez-CNP.     Medical Decision Making  65 y/o M c/o sinus pressure, cough, green/yellow nasal d/c for over 10 days and not improving. Denies SOB, CP, fever, chills, dizziness  Rapid flu- NEG    suspect bacterial sinusitis d/t clinical feature s/s progression and duration including PE so will treat with augmentin, and advised to implement normal saline mist spray, flonase daily, and if s/s persist after 24-48 hours to take a covid test and f/u with PCP    Right TM-mild- ear involvement, refrain from swimming and flying until f/u with PCP  Normal saline mist spray 3 times a day to clear out sinuses and reduce PND  Advised to take daily anti-histamines   alternate Tylenol and Motrin PRN for discomfort  gargle with warm water and salt  f/u PCP 2-3 days  Follow up Care: Pt instructed to follow-up with PCP or other appropriate clinician within 24 to 48 hours. Report to ED if there is any development in worsening pain, difficulty swallowing, change in phonation, fever, chills, neck pain, photophobia, headache, neck stiffness, chest pain, abdominal pain, vomiting, syncope, hemoptysis, leg swelling SOB, fever, facial swelling, eye pain, periorbital swelling/erythema, or any new signs or sx.     The patient and/or family was educated regarding diagnosis, supportive care, OTC and Rx medications. The patient and/or family was given the opportunity to ask questions prior to discharge. They verbalized understanding of my discussion of the plans for treatment, expected course, indications to return to  or seek further evaluation in ED, and the need for timely follow up as directed.     Most of the time, sinusitis does NOT need to be treated with antibiotics. This is because most sinusitis is caused by  viruses - not bacteria - and antibiotics do not kill viruses. Many people get over sinus infections without antibiotics.  Some people with sinusitis do need treatment with antibiotics. If your symptoms have not improved after 10 - 14 days, please see your Primary Care Physician. Your doctor might recommend that you wait 1 more week to see if your symptoms improve. But if you have symptoms such as a fever, he or she might prescribe antibiotics. It is important to follow your doctor's instructions about taking your antibiotics.    Sinusitis is a condition that can cause a stuffy nose, pain in the face, and discharge (mucus) from the nose. The sinuses are hollow areas in the bones of the face. They have a thin lining that normally makes a small amount of mucus. When this lining gets irritated or infected, it swells and makes extra mucus. This causes symptoms.  Sinusitis can occur when a person gets sick with a cold. The germs causing the cold can also infect the sinuses. Many times, a person feels like his or her cold is getting better. But then he or she gets sinusitis and begins to feel sick again.    Common symptoms of sinusitis include:  -Stuffy or blocked nose  -Thick white, yellow, or green discharge from the no  -Pain in the teeth  -Pain or pressure in the face - This often feels worse when a person bends forward.    People with sinusitis can also have other symptoms that include:  -Fever  -Cough  -Trouble smelling  -Ear pressure or fullness  -Headache  -Bad breath  -Feeling tired    Most of the time, symptoms start to improve in 7 to 10 days.    To reduce your symptoms    Discharge Home Care:    Take medication as prescribed and incorporate probiotics for gut health  normal saline mist spray three times and day and flonase daily  Increase fluids, to help thin congestion. You might use a cool mist humidifier/vaporizer in your room if the air is dry. This will help thin congestion and keep your sinus  moist.  Sleeping in a more upright position can be helpful.  Using saline nose drops or mists can also help thick sinus congestion drain. Apply the mist or drops, then tip your head back and rotate from side to side to help    You can use acetaminophen (paracetamol, Tylenol) or ibuprofen (Motrin) for fever or headache. Drink warm teas with honey.  If you do not improve or you begin to worsen please follow up with your primary care physician.        PATIENT INSTRUCTIONS:    HOME CARE INSTRUCTIONS:  --- Drink plenty of water. Water helps thin the mucus so your sinuses can drain more easily.  --- Use a humidifier.  --- Inhale steam 3 to 4 times a day (for example, sit in the bathroom with the shower running).  --- Apply a warm, moist washcloth to your face 3 to 4 times a day, or as directed by your caregiver.  --- Take over-the-counter or prescription medicines for pain, discomfort, or fever only as directed by your caregiver.    SEEK FURTHER TREATMENT IF YOU:  --- You have increasing pain or severe headaches.  --- You have nausea, vomiting, or drowsiness.  --- You have swelling around your face.  --- You have vision problems.  --- You have a stiff neck.  --- You have difficulty breathing.      Orders and Diagnoses  Diagnoses and all orders for this visit:  Subacute cough  -     POCT Influenza A/B manually resulted  Acute non-recurrent frontal sinusitis  -     amoxicillin-pot clavulanate (Augmentin) 875-125 mg tablet; Take 1 tablet (875 mg) by mouth 2 times a day for 7 days.  -     benzonatate (Tessalon) 200 mg capsule; Take 1 capsule (200 mg) by mouth 3 times a day as needed for cough for up to 7 days. Do not crush or chew.      Medical Admin Record      Patient disposition: Home    Electronically signed by [facility]  8:37 AM

## 2025-01-22 DIAGNOSIS — M54.2 NECK PAIN: ICD-10-CM

## 2025-01-23 RX ORDER — GABAPENTIN 600 MG/1
TABLET, FILM COATED ORAL
Qty: 90 TABLET | Refills: 2 | Status: SHIPPED | OUTPATIENT
Start: 2025-01-23

## 2025-05-16 ENCOUNTER — APPOINTMENT (OUTPATIENT)
Dept: PRIMARY CARE | Facility: CLINIC | Age: 65
End: 2025-05-16
Payer: COMMERCIAL

## 2025-05-21 ENCOUNTER — APPOINTMENT (OUTPATIENT)
Dept: PRIMARY CARE | Facility: CLINIC | Age: 65
End: 2025-05-21
Payer: COMMERCIAL

## 2025-05-21 VITALS
HEART RATE: 69 BPM | DIASTOLIC BLOOD PRESSURE: 78 MMHG | BODY MASS INDEX: 27.3 KG/M2 | SYSTOLIC BLOOD PRESSURE: 120 MMHG | OXYGEN SATURATION: 96 % | WEIGHT: 206 LBS | HEIGHT: 73 IN

## 2025-05-21 DIAGNOSIS — M25.562 ACUTE PAIN OF LEFT KNEE: ICD-10-CM

## 2025-05-21 DIAGNOSIS — I10 PRIMARY HYPERTENSION: ICD-10-CM

## 2025-05-21 DIAGNOSIS — R55 NEAR SYNCOPE: ICD-10-CM

## 2025-05-21 DIAGNOSIS — Z12.5 PROSTATE CANCER SCREENING: ICD-10-CM

## 2025-05-21 DIAGNOSIS — E78.00 HYPERCHOLESTEROLEMIA: ICD-10-CM

## 2025-05-21 DIAGNOSIS — M54.2 NECK PAIN: ICD-10-CM

## 2025-05-21 DIAGNOSIS — Z88.9 MULTIPLE DRUG ALLERGIES: ICD-10-CM

## 2025-05-21 DIAGNOSIS — N20.0 NEPHROLITHIASIS: ICD-10-CM

## 2025-05-21 DIAGNOSIS — Z82.49 FAMILY HISTORY OF CORONARY ARTERY DISEASE IN FATHER: ICD-10-CM

## 2025-05-21 DIAGNOSIS — Z00.00 MEDICARE ANNUAL WELLNESS VISIT, SUBSEQUENT: Primary | ICD-10-CM

## 2025-05-21 DIAGNOSIS — I77.810 AORTIC ROOT DILATATION: ICD-10-CM

## 2025-05-21 DIAGNOSIS — I35.1 MILD AORTIC REGURGITATION: ICD-10-CM

## 2025-05-21 DIAGNOSIS — E04.1 THYROID NODULE: ICD-10-CM

## 2025-05-21 PROBLEM — N41.0 ACUTE PROSTATITIS: Status: RESOLVED | Noted: 2023-11-08 | Resolved: 2025-05-21

## 2025-05-21 PROCEDURE — 3078F DIAST BP <80 MM HG: CPT | Performed by: SPECIALIST

## 2025-05-21 PROCEDURE — 1123F ACP DISCUSS/DSCN MKR DOCD: CPT | Performed by: SPECIALIST

## 2025-05-21 PROCEDURE — 1125F AMNT PAIN NOTED PAIN PRSNT: CPT | Performed by: SPECIALIST

## 2025-05-21 PROCEDURE — 3008F BODY MASS INDEX DOCD: CPT | Performed by: SPECIALIST

## 2025-05-21 PROCEDURE — 1159F MED LIST DOCD IN RCRD: CPT | Performed by: SPECIALIST

## 2025-05-21 PROCEDURE — 3074F SYST BP LT 130 MM HG: CPT | Performed by: SPECIALIST

## 2025-05-21 PROCEDURE — 1160F RVW MEDS BY RX/DR IN RCRD: CPT | Performed by: SPECIALIST

## 2025-05-21 PROCEDURE — G0439 PPPS, SUBSEQ VISIT: HCPCS | Performed by: SPECIALIST

## 2025-05-21 PROCEDURE — 1158F ADVNC CARE PLAN TLK DOCD: CPT | Performed by: SPECIALIST

## 2025-05-21 PROCEDURE — 1170F FXNL STATUS ASSESSED: CPT | Performed by: SPECIALIST

## 2025-05-21 PROCEDURE — 93000 ELECTROCARDIOGRAM COMPLETE: CPT | Performed by: SPECIALIST

## 2025-05-21 RX ORDER — GABAPENTIN 600 MG/1
TABLET, FILM COATED ORAL
Qty: 90 TABLET | Refills: 1 | Status: SHIPPED | OUTPATIENT
Start: 2025-05-21 | End: 2025-05-21 | Stop reason: SDUPTHER

## 2025-05-21 RX ORDER — LISINOPRIL 40 MG/1
40 TABLET ORAL DAILY
Qty: 90 TABLET | Refills: 1 | Status: SHIPPED | OUTPATIENT
Start: 2025-05-21

## 2025-05-21 RX ORDER — GABAPENTIN 600 MG/1
TABLET, FILM COATED ORAL
Qty: 90 TABLET | Refills: 5 | Status: SHIPPED | OUTPATIENT
Start: 2025-05-21

## 2025-05-21 ASSESSMENT — PAIN SCALES - GENERAL: PAINLEVEL_OUTOF10: 2

## 2025-05-21 ASSESSMENT — PATIENT HEALTH QUESTIONNAIRE - PHQ9
1. LITTLE INTEREST OR PLEASURE IN DOING THINGS: NOT AT ALL
SUM OF ALL RESPONSES TO PHQ9 QUESTIONS 1 AND 2: 0
SUM OF ALL RESPONSES TO PHQ9 QUESTIONS 1 AND 2: 0
2. FEELING DOWN, DEPRESSED OR HOPELESS: NOT AT ALL
2. FEELING DOWN, DEPRESSED OR HOPELESS: NOT AT ALL
1. LITTLE INTEREST OR PLEASURE IN DOING THINGS: NOT AT ALL

## 2025-05-21 ASSESSMENT — ENCOUNTER SYMPTOMS
OCCASIONAL FEELINGS OF UNSTEADINESS: 0
LOSS OF SENSATION IN FEET: 0
DEPRESSION: 0

## 2025-05-21 ASSESSMENT — ACTIVITIES OF DAILY LIVING (ADL)
BATHING: INDEPENDENT
GROCERY_SHOPPING: INDEPENDENT
TAKING_MEDICATION: INDEPENDENT
DOING_HOUSEWORK: INDEPENDENT
MANAGING_FINANCES: INDEPENDENT
DRESSING: INDEPENDENT

## 2025-05-21 NOTE — ASSESSMENT & PLAN NOTE
Noted on Cardiac echo 12/2020  Not noted on 12/2022 echo  For repeat echo in 3 yrs  Given near syncopal episode, ordered repeat Echo  Referred to cardiology  Orders:    Transthoracic echo (TTE) complete; Future    Referral to Cardiology; Future

## 2025-05-21 NOTE — ASSESSMENT & PLAN NOTE
Well controlled on current medication  Refilled lisinopril  Labs ordered  Orders:    lisinopril 40 mg tablet; Take 1 tablet (40 mg) by mouth once daily.    CBC and Auto Differential; Future    Comprehensive Metabolic Panel; Future

## 2025-05-21 NOTE — ASSESSMENT & PLAN NOTE
Influ vaccine fall 2024 at [facility] vaccine fall 2024  [facility] vaccine fall 20204  Prior Tdap 12/29/2022  PPPSV23 2016, recommend prevnar 20 or 21  Prior negative HCV 2015  Recommend Shingrix vacs  Labs ordered:  CMP CBC PSA TSH Free T4 Free T3

## 2025-05-21 NOTE — ASSESSMENT & PLAN NOTE
Declines statin therapy  LDL 91 last year and Lp(a) normal  Orders:    Comprehensive Metabolic Panel; Future    Lipid Panel; Future

## 2025-05-21 NOTE — PATIENT INSTRUCTIONS
Recommend Prevnar 20 or 21  Recommend Covid vaccine every 6 months  Recommend annual follow-up with urology

## 2025-05-21 NOTE — PROGRESS NOTES
"Subjective   Patient ID: Christiano Artis is a 65 y.o. male who presents for Annual Exam (physical).  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  Hasn't been a problem.  Ran a half marathon 2 weeks ago and has been a little painful, no swelling but on medial aspect it a bit painful, no instability.    Yesterday, after the half marathon there was a 20 mile endurance hike so was out doing 14 miles on flat ground.  At mile 11, apple watch said heart rate 117.  Felt lightheaded and a bit like he might pass out.  Sat down and rested and felt fine.  HR went back down to 92 after a couple of minutes and he finished the remaining 3 miles.  Had water pack with him he was using along the way.  He was taking electrolyte chew tablets.  Watch did not report Afib    Discussed multiple antibiotic allergies, thinks that one of them was \"precautionary\" Cipro.  Knows he had rash on Sufla and Erythromycin.  Will refer to allergy for testing.    On Gabapentin ER   No longer on Nsaid    Said he never had chicken pox or shingles    Has living will and health care POA  Wife is POA    Goals of Care:  Treat treatable conditions, but would not want to prolong the act of dying through extraordinary means if no hope for meaningful recovery.    Allergies[1]   Current Outpatient Medications   Medication Instructions    gabapentin (Gralise) 600 mg tablet extended release 24 hr TAKE 3 CAPSULES (1,800MG) BY MOUTH DAILY WITH DINNER    lisinopril 40 mg, oral, Daily    multivitamin tablet 1 tablet, Daily        Review of Systems  Constitutional  No fatigue, no " "fevers, no chills, no unintentional weight loss,   HEENT:  No headaches, no dizziness, no double vision, no blurred vision, eye exams current,hearing aids for right greater than left hearing loss  Cardiovascular:  No chest pain, no palpitations, no shortness of breath with exertion (one flight of stairs),   Respiratory:  No cough, no hemoptysis, no wheezing, No shortness of breath at rest  GI:  No dysphagia, no odynophagia, no reflux, no abdominal pain, no nausea, no vomiting, no changes in bowel habits, no bright red blood per rectum, no melena  :  No urinary frequency, no dysuria, no urine incontinence no noctguria  MSK:  No falls, left knee joint pain, no joint swelling  Neuro:  No tremors, no extremity weakness, no changes in sensation    Physical Exam  /78   Pulse 69   Ht 1.854 m (6' 1\")   Wt 93.4 kg (206 lb)   SpO2 96%   BMI 27.18 kg/m²   General: Well-appearing  M in no acute distress, well nourished, well hydrated  Head:  Normocephalic, atraumatic  Eyes:  Anicteric sclera, pupils equal  Ears:       TMs intact  Oral:     Not examined due to pandemic  Neck:   Supple, no cervical/supraclavicular adenopathy, no thyromegaly or nodules appreciated on exam  Cor:      Regular rate, normal S1, S2, no murmurs appreciated, no S3, no S4   Lungs:   Clear to auscultation b/l, no wheezes, no rhonchi, no crackles, no accessory respiratory muscle use  Abd:          Soft, nontender, no guarding, no rebound, no hepatosplenomegaly appreciated   Ext:            No lower extremity edema, no palpable cords  Pulses:      Pedal pulses intact  Neuro:   CN2-12 grossly intact (except funduscopic exam not performed and visual fields not examined)  EKG:                 Sinus bradycardia at rate of 58, first degree block, no ST elevations/depressions, EKG essentially unchanged when compared to 8/3/2020 EKG    Assessment/Plan   Assessment & Plan  Neck pain  Refilled Gralise  Pain controlled on current dose  Orders:    " gabapentin (Gralise) 600 mg tablet extended release 24 hr; TAKE 3 CAPSULES (1,800MG) BY MOUTH DAILY WITH DINNER    Primary hypertension  Well controlled on current medication  Refilled lisinopril  Labs ordered  Orders:    lisinopril 40 mg tablet; Take 1 tablet (40 mg) by mouth once daily.    CBC and Auto Differential; Future    Comprehensive Metabolic Panel; Future    Family history of coronary artery disease in father  Family history of CAD in fathe  Orders:    Lipid Panel; Future    Hypercholesterolemia  Declines statin therapy  LDL 91 last year and Lp(a) normal  Orders:    Comprehensive Metabolic Panel; Future    Lipid Panel; Future      Near syncope  EKG today  Mildly enlarged aortic root 2022, repeat echo recommended in 3-5 yrs  Initial echo demonstrated mild AR but not noted on 2022 echo  Ordered Echo  Referred to Cardiology  Orders:    CBC and Auto Differential; Future    Comprehensive Metabolic Panel; Future    Magnesium; Future    ECG 12 lead (Clinic Performed)    Transthoracic echo (TTE) complete; Future    Referral to Cardiology; Future      Thyroid nodule  US thyroid 5/121/24   Thyroid US 6/1/2023 with TiRADs 3 nodule Right lobe 1.7x.8x1.6 cm (was 1.4x.7x1.2 cm), continued follow up recommended  Prior FNA 2014  Thyroid Nodule on 2018 US with FNA  Was to get repeat US and follow up with Dr. Barkley   Ordered thyroid US  Orders:    Thyroid Stimulating Hormone; Future    Thyroxine, Free; Future    Triiodothyronine, Free; Future    US thyroid; Future    Prostate cancer screening  Hx of HoLEP 8/2023  Labs ordered  Orders:    Prostate Specific Antigen, Screen; Future    Nephrolithiasis  Hx of kidney stones, has nonobstrucing stones,   Urology 11/18/2024 recommended repeat CT stone protocol and f/up virtually  CT 12/3/2024 punctate b/l nonoobstrucing stone measuring up to 2 mm  (Also noted Interval improvement enlarged spleen now 11.7 cm)  Recommend annual follow-up with urology, said he will f/up with  "urology       Acute pain of left knee  Started after half marathon  Recommend topical voltaren  Referred to Ortho  Orders:    Referral to Orthopedics and Sports Medicine; Future    Mild aortic regurgitation  Noted on Cardiac echo 12/2020  Not noted on 12/2022 echo  For repeat echo in 3 yrs  Given near syncopal episode, ordered repeat Echo  Referred to cardiology  Orders:    Transthoracic echo (TTE) complete; Future    Referral to Cardiology; Future    Multiple drug allergies  Discussed, said he thinks one class of drug was \"precautionary\"  Refer to Allergy given multiple drug allergies to help determine which class of antibiotics may be safely considered  Orders:    Referral to Allergy; Future    Aortic root dilatation  Cardiology eval 2/11/2022  Echo 12/13/2022 aortic root mildly enlarged at 4.1 cm, no significant change when compared to 12/14/2020  For repeat echo 3 yrs, ordered  Near syncopal episode  Referred to Cardiology  Orders:    Referral to Cardiology; Future    Medicare annual wellness visit, subsequent  [facility]  RSV vaccine fall 20204  Prior Tdap 12/29/2022  PPPSV23 2016, recommend Prevnar 20 or 21  Prior negative HCV 2015  Recommend Shingrix vacs  Colonoscopy 2009, declined further  Negative fecal occult blood immunoassay in 2/21/2022  Previously ordered Fecal Occult Blood immunoassays but were not done 2024  Consider Cologuard  Labs ordered:  CMP CBC PSA TSH Free T4 Free T3          Krista Campos, DO          [1]   Allergies  Allergen Reactions    Azithromycin Rash    Ciprofloxacin Rash    Erythromycin Rash    Sulfamethoxazole-Trimethoprim Rash    Tetracyclines Rash     "

## 2025-05-21 NOTE — ASSESSMENT & PLAN NOTE
US thyroid 5/121/24   Thyroid US 6/1/2023 with TiRADs 3 nodule Right lobe 1.7x.8x1.6 cm (was 1.4x.7x1.2 cm), continued follow up recommended  Prior FNA 2014  Thyroid Nodule on 2018 US with FNA  Was to get repeat US and follow up with Dr. Barkley   Ordered thyroid US  Orders:    Thyroid Stimulating Hormone; Future    Thyroxine, Free; Future    Triiodothyronine, Free; Future    US thyroid; Future

## 2025-05-21 NOTE — ASSESSMENT & PLAN NOTE
Hx of [facility] 8/2023  Labs ordered  Orders:    Prostate Specific Antigen, Screen; Future

## 2025-05-21 NOTE — ASSESSMENT & PLAN NOTE
"Discussed, said he thinks one class of drug was \"precautionary\"  Refer to Allergy given multiple drug allergies to help determine which class of antibiotics may be safely considered  Orders:    Referral to Allergy; Future    "

## 2025-05-21 NOTE — ASSESSMENT & PLAN NOTE
Refilled Gralise  Pain controlled on current dose  Orders:    gabapentin (Gralise) 600 mg tablet extended release 24 hr; TAKE 3 CAPSULES (1,800MG) BY MOUTH DAILY WITH DINNER

## 2025-05-21 NOTE — ASSESSMENT & PLAN NOTE
Cardiology eval 2/11/2022  Echo 12/13/2022 aortic root mildly enlarged at 4.1 cm, no significant change when compared to 12/14/2020  For repeat echo 3 yrs, ordered  Near syncopal episode  Referred to Cardiology  Orders:    Referral to Cardiology; Future

## 2025-05-21 NOTE — ASSESSMENT & PLAN NOTE
Hx of kidney stones, has nonobstrucing stones,   Urology 11/18/2024 recommended repeat CT stone protocol and f/up virtually  CT 12/3/2024 punctate b/l nonoobstrucing stone measuring up to 2 mm  (Also noted Interval improvement enlarged spleen now 11.7 cm)  Recommend annual follow-up with urology, said he will f/up with urology

## 2025-05-27 DIAGNOSIS — Z12.11 SCREEN FOR COLON CANCER: Primary | ICD-10-CM

## 2025-05-27 PROBLEM — Z00.00 MEDICARE ANNUAL WELLNESS VISIT, SUBSEQUENT: Status: ACTIVE | Noted: 2025-05-27

## 2025-05-27 PROBLEM — I49.5 SICK SINUS SYNDROME (MULTI): Status: RESOLVED | Noted: 2023-11-08 | Resolved: 2025-05-27

## 2025-05-27 NOTE — ASSESSMENT & PLAN NOTE
Influ vaccine fall 2024 at [facility]  Covid vaccine fall 2024, recommend every 6 mos  RSV vaccine fall 20204  Prior Tdap 12/29/2022  PPPSV23 2016, recommend Prevnar 20 or 21  Prior negative HCV 2015  Recommend Shingrix vacs  Colonoscopy 2009, declined further  Negative fecal occult blood immunoassay in 2/21/2022  Previously ordered Fecal Occult Blood immunoassays but were not done 2024  Consider Cologuard  Labs ordered:  CMP CBC PSA TSH Free T4 Free T3

## 2025-05-28 ENCOUNTER — HOSPITAL ENCOUNTER (OUTPATIENT)
Dept: RADIOLOGY | Facility: CLINIC | Age: 65
Discharge: HOME | End: 2025-05-28
Payer: MEDICARE

## 2025-05-28 ENCOUNTER — APPOINTMENT (OUTPATIENT)
Dept: ORTHOPEDIC SURGERY | Facility: HOSPITAL | Age: 65
End: 2025-05-28
Payer: MEDICARE

## 2025-05-28 ENCOUNTER — HOSPITAL ENCOUNTER (OUTPATIENT)
Dept: RADIOLOGY | Facility: HOSPITAL | Age: 65
Discharge: HOME | End: 2025-05-28
Payer: MEDICARE

## 2025-05-28 DIAGNOSIS — M25.562 ACUTE PAIN OF LEFT KNEE: ICD-10-CM

## 2025-05-28 DIAGNOSIS — E04.1 THYROID NODULE: ICD-10-CM

## 2025-05-28 DIAGNOSIS — M25.562 LEFT KNEE PAIN, UNSPECIFIED CHRONICITY: ICD-10-CM

## 2025-05-28 PROCEDURE — 99214 OFFICE O/P EST MOD 30 MIN: CPT | Performed by: STUDENT IN AN ORGANIZED HEALTH CARE EDUCATION/TRAINING PROGRAM

## 2025-05-28 PROCEDURE — 73564 X-RAY EXAM KNEE 4 OR MORE: CPT | Mod: LEFT SIDE | Performed by: INTERNAL MEDICINE

## 2025-05-28 PROCEDURE — 1159F MED LIST DOCD IN RCRD: CPT | Performed by: STUDENT IN AN ORGANIZED HEALTH CARE EDUCATION/TRAINING PROGRAM

## 2025-05-28 PROCEDURE — 73564 X-RAY EXAM KNEE 4 OR MORE: CPT | Mod: LT

## 2025-05-28 PROCEDURE — 76536 US EXAM OF HEAD AND NECK: CPT | Performed by: STUDENT IN AN ORGANIZED HEALTH CARE EDUCATION/TRAINING PROGRAM

## 2025-05-28 PROCEDURE — 1036F TOBACCO NON-USER: CPT | Performed by: STUDENT IN AN ORGANIZED HEALTH CARE EDUCATION/TRAINING PROGRAM

## 2025-05-28 PROCEDURE — 76536 US EXAM OF HEAD AND NECK: CPT

## 2025-05-28 PROCEDURE — 99204 OFFICE O/P NEW MOD 45 MIN: CPT | Performed by: STUDENT IN AN ORGANIZED HEALTH CARE EDUCATION/TRAINING PROGRAM

## 2025-05-28 RX ORDER — MELOXICAM 15 MG/1
15 TABLET ORAL DAILY
Qty: 15 TABLET | Refills: 0 | Status: SHIPPED | OUTPATIENT
Start: 2025-05-28 | End: 2025-06-12

## 2025-05-28 NOTE — PROGRESS NOTES
Christiano Artis  is a 65 y.o. year-old  male. he  is a new patient to our office and presents with a chief complaint of Left  knee pain. He is an active 65 yr old who recently participated in the [employer] marathon and reports a flare up of his medial sided left knee pain. He is also an ice hockey ref. Denies any injury or swelling to the knee. Denies mechanical symptoms. He was seen by Dr. Wiseman last year for the same complaint and was treated with Naproxen with significant relief. No injections.       Past Medical, Family, and Social History reviewed   Review of Systems  A complete review of systems was conducted, pertinent only to the HPI noted above.  Constitutional: None  Eyes: No additions to above history  Ears, Nose, Throat: No additions to above history  Cardiovascular: No additions to above history  Respiratory: No additions to above history  GI: No additions to above history  : No additions to above history  Skin/Neuro: No additions to above history  Endocrine/Heme/Lymph: No additions to above history  Immunologic: No additions to above history  Psychiatric: No additions to above history  Musculoskeletal: see above    GEN: Alert and Oriented x 3  Constitutional: Well appearing , in no apparent distress.  Skin: No rashes, erythema, or induration around knee    MUSCULOSKELETAL EXAM:     Left KNEE:  ROM: 5/0/130  Effusion: negative  Alignment: [neutral]      Gait: [normal]  Sensation intact bilaterally sural/saphenous/sp/dp/tibial nerve bilaterally  Motor 5/5 knee flexion/extension/foot DF/PF/EHL/FHL bilaterally  Palpable/symmetric DP and PT pulse bilaterally      PATELLAR/EXTENSOR MECHANISM:   KNEE:  Patellar Crepitus: Y  Patellar Compression Pain:n  Patellar Apprehension: [no]  Extensor Mechanism: [intact]  Straight Leg Raise: good      LIGAMENTS:  ACL: Lachmans: [negative]  PCL: [stable]  Valgus at 0 degrees: [stable]  Valgus at 30 degrees: [stable]  Varus at 0 degrees: [stable]  Varus at 30 degrees:  [stable]    MENISCUS EXAM:  Joint Line Tenderness:medial joint line tenderness  McMurrays: positive  Pain with Deep Flexion: [No]    Xrays independently viewed and interpreted:   Mild Medial joint space narrowing with early osteophyte formation of patellofemoral joint. No fractures.     The patient history, physical examination and imaging studies are consistent with early stage knee arthritis versus degenerative meniscus tear.. The treatment options including NSAIDs and activity modification. He would like to try a trial of meloxicam. If he does not see any improvement, we can consider a CSI.  We discussed the potential MRI at some point to look for meniscus tear if his symptoms do not resolve or are recurrent and limiting his function.  The patient acknowledged the current plan.     Follow up will be 4-6 weeks if he would like to move forward with an injection.

## 2025-06-05 ENCOUNTER — HOSPITAL ENCOUNTER (OUTPATIENT)
Dept: CARDIOLOGY | Facility: HOSPITAL | Age: 65
Discharge: HOME | End: 2025-06-05
Payer: MEDICARE

## 2025-06-05 DIAGNOSIS — I35.1 MILD AORTIC REGURGITATION: ICD-10-CM

## 2025-06-05 DIAGNOSIS — R55 NEAR SYNCOPE: ICD-10-CM

## 2025-06-05 LAB
AORTIC VALVE MEAN GRADIENT: 4 MMHG
AORTIC VALVE PEAK VELOCITY: 1.46 M/S
AV PEAK GRADIENT: 9 MMHG
AVA (PEAK VEL): 3.56 CM2
AVA (VTI): 3.81 CM2
EJECTION FRACTION APICAL 4 CHAMBER: 73
EJECTION FRACTION: 70 %
LEFT ATRIUM VOLUME AREA LENGTH INDEX BSA: 27 ML/M2
LEFT VENTRICLE INTERNAL DIMENSION DIASTOLE: 4.28 CM (ref 3.5–6)
LEFT VENTRICULAR OUTFLOW TRACT DIAMETER: 2.33 CM
MITRAL VALVE E/A RATIO: 0.7
RIGHT VENTRICLE FREE WALL PEAK S': 14 CM/S
RIGHT VENTRICLE PEAK SYSTOLIC PRESSURE: 22 MMHG
TRICUSPID ANNULAR PLANE SYSTOLIC EXCURSION: 2.5 CM

## 2025-06-05 PROCEDURE — 93306 TTE W/DOPPLER COMPLETE: CPT | Performed by: STUDENT IN AN ORGANIZED HEALTH CARE EDUCATION/TRAINING PROGRAM

## 2025-06-05 PROCEDURE — 93306 TTE W/DOPPLER COMPLETE: CPT

## 2025-06-07 LAB
ALBUMIN SERPL-MCNC: 4.3 G/DL (ref 3.6–5.1)
ALP SERPL-CCNC: 125 U/L (ref 35–144)
ALT SERPL-CCNC: 21 U/L (ref 9–46)
ANION GAP SERPL CALCULATED.4IONS-SCNC: 9 MMOL/L (CALC) (ref 7–17)
AST SERPL-CCNC: 28 U/L (ref 10–35)
BASOPHILS # BLD AUTO: 58 CELLS/UL (ref 0–200)
BASOPHILS NFR BLD AUTO: 1 %
BILIRUB SERPL-MCNC: 1.5 MG/DL (ref 0.2–1.2)
BUN SERPL-MCNC: 26 MG/DL (ref 7–25)
CALCIUM SERPL-MCNC: 9.2 MG/DL (ref 8.6–10.3)
CHLORIDE SERPL-SCNC: 107 MMOL/L (ref 98–110)
CHOLEST SERPL-MCNC: 158 MG/DL
CHOLEST/HDLC SERPL: 3.1 (CALC)
CO2 SERPL-SCNC: 25 MMOL/L (ref 20–32)
CREAT SERPL-MCNC: 0.89 MG/DL (ref 0.7–1.35)
EGFRCR SERPLBLD CKD-EPI 2021: 95 ML/MIN/1.73M2
EOSINOPHIL # BLD AUTO: 331 CELLS/UL (ref 15–500)
EOSINOPHIL NFR BLD AUTO: 5.7 %
ERYTHROCYTE [DISTWIDTH] IN BLOOD BY AUTOMATED COUNT: 13.8 % (ref 11–15)
GLUCOSE SERPL-MCNC: 92 MG/DL (ref 65–99)
HCT VFR BLD AUTO: 45.8 % (ref 38.5–50)
HDLC SERPL-MCNC: 51 MG/DL
HGB BLD-MCNC: 14.9 G/DL (ref 13.2–17.1)
LDLC SERPL CALC-MCNC: 86 MG/DL (CALC)
LYMPHOCYTES # BLD AUTO: 2042 CELLS/UL (ref 850–3900)
LYMPHOCYTES NFR BLD AUTO: 35.2 %
MAGNESIUM SERPL-MCNC: 2.2 MG/DL (ref 1.5–2.5)
MCH RBC QN AUTO: 29.4 PG (ref 27–33)
MCHC RBC AUTO-ENTMCNC: 32.5 G/DL (ref 32–36)
MCV RBC AUTO: 90.3 FL (ref 80–100)
MONOCYTES # BLD AUTO: 603 CELLS/UL (ref 200–950)
MONOCYTES NFR BLD AUTO: 10.4 %
NEUTROPHILS # BLD AUTO: 2767 CELLS/UL (ref 1500–7800)
NEUTROPHILS NFR BLD AUTO: 47.7 %
NONHDLC SERPL-MCNC: 107 MG/DL (CALC)
PLATELET # BLD AUTO: 217 THOUSAND/UL (ref 140–400)
PMV BLD REES-ECKER: 10.5 FL (ref 7.5–12.5)
POTASSIUM SERPL-SCNC: 4.2 MMOL/L (ref 3.5–5.3)
PROT SERPL-MCNC: 6.8 G/DL (ref 6.1–8.1)
PSA SERPL-MCNC: 0.88 NG/ML
RBC # BLD AUTO: 5.07 MILLION/UL (ref 4.2–5.8)
SODIUM SERPL-SCNC: 141 MMOL/L (ref 135–146)
T3FREE SERPL-MCNC: 3.4 PG/ML (ref 2.3–4.2)
T4 FREE SERPL-MCNC: 1.2 NG/DL (ref 0.8–1.8)
TRIGL SERPL-MCNC: 109 MG/DL
TSH SERPL-ACNC: 1.41 MIU/L (ref 0.4–4.5)
WBC # BLD AUTO: 5.8 THOUSAND/UL (ref 3.8–10.8)

## 2025-06-14 LAB — NONINV COLON CA DNA+OCC BLD SCRN STL QL: POSITIVE

## 2025-06-17 DIAGNOSIS — R19.5 POSITIVE COLORECTAL CANCER SCREENING USING COLOGUARD TEST: Primary | ICD-10-CM

## 2025-06-18 DIAGNOSIS — Z12.11 SPECIAL SCREENING FOR MALIGNANT NEOPLASMS, COLON: ICD-10-CM

## 2025-06-18 RX ORDER — POLYETHYLENE GLYCOL 3350, SODIUM SULFATE ANHYDROUS, SODIUM BICARBONATE, SODIUM CHLORIDE, POTASSIUM CHLORIDE 236; 22.74; 6.74; 5.86; 2.97 G/4L; G/4L; G/4L; G/4L; G/4L
4 POWDER, FOR SOLUTION ORAL ONCE
Qty: 4000 ML | Refills: 0 | Status: SHIPPED | OUTPATIENT
Start: 2025-06-18 | End: 2025-06-18

## 2025-07-01 NOTE — PROGRESS NOTES
"Primary Care Physician: Krista Campos, DO  Date of Visit: 2025  8:00 AM EDT      Chief Complaint:     Pt referred by Dr. Campos for near syncope     HPI / Summary:   Christiano Artis is a 65 y.o. male with history of mildly enlarged ascending aorta upwards of 4 cm dating back to , paroxysmal SVT/AVNRT ablation , HTN    I last saw him 2022 in which he was recommended to follow-up with me regarding his aortic insufficiency and ascending aorta.  Ascending aorta was stable in size at 4 cm and mild aortic insufficiency.    He returns now to review episode of presyncope.  This occurred around the middle of May while he was out on a hike (training for 20 mile hike) he reported getting significantly lightheaded and dizzy around mild 12.  He had never experienced this before.  Felt like head was spinning.  He denied any chest pain or pressure.  No shortness of breath.  No palpitations like what he experienced with his SVT.  He sat down for about 10 minutes and waited, drank some water, felt better and then continued hike to get home.  Says he walks significantly slower on the way home.  Since that episode in May he has had no further episodes.  He continues his training and completed the 20 mile hike challenge.  Prior to this hike challenge he was also training for half marathon and completed that earlier in the spring.  He does report on the day of his presyncope it was very hot, \"probably close to 90 degrees that day.\"  He thinks perhaps he did not have as much to drink or he did not take his electrolyte tablets as he normally does.    Dr. Campos ordered updated labs for him about a month ago which showed normal TSH, CMP and CBC.  She also completed an echo dated echocardiogram as below showing structurally normal LVEF with mild LV thickness, LVEF 70%, mild diastolic dysfunction and mild aortic regurgitation.        Last Cardiology Tests:  EC25  Sinus bradycardia     Echo:  25   1. Left " ventricular ejection fraction is normal calculated by Colon's biplane at 70%.   2. Spectral Doppler shows a Grade I (impaired relaxation pattern) of left ventricular diastolic filling with normal left atrial filling pressure.   3. There is normal right ventricular global systolic function.   4. The doppler estimated RVSP is within normal limits with a right ventricular systolic pressure of 22 mmHg.   5. Mild aortic valve regurgitation.   6. Compared with study dated 12/13/2022, no significant change.    TTE 12/2020: LVEF 60%, aortic root 4.0 cm, mild AI  TTE 2014: EF 60%, mildly dilated ascending aorta listed as 4.3 cm  Coronary calcium score CT 2020: Low at 16, aortic atheroma visualized. Ascending aorta 3.7 cm       Cath:      Stress Test:    Cardiac Imaging:      Past Medical History:  Medical History[1]     Past Surgical History:  Surgical History[2]       Social History:  He reports that he has never smoked. He has never used smokeless tobacco. He reports current alcohol use of about 1.0 standard drink of alcohol per week. He reports that he does not use drugs.    Family History:  family history includes Allergies in an other family member; Coronary artery disease in his father; Heart failure in his father; Hypertension in his brother; Nephrolithiasis in his father; htn in his father, mother, and sister; prediabetes in his father.      Allergies:  RX Allergies[3]    Outpatient Medications:  Current Outpatient Medications   Medication Instructions    gabapentin (Gralise) 600 mg tablet extended release 24 hr TAKE 3 CAPSULES (1,800MG) BY MOUTH DAILY WITH DINNER    lisinopril 40 mg, oral, Daily    multivitamin tablet 1 tablet, Daily       Review of Systems:  A complete 10 point ROS was performed and is negative except for HPI    Physical Exam:  GENERAL: alert, cooperative, pleasant, in no acute distress  SKIN: warm, dry, no rash.  NECK: no JVD, no JEANETH  CARDIAC: Regular rate and rhythm with no rubs, murmurs, or  "gallops  CHEST: Normal respiratory efforts, lungs clear to auscultation bilaterally.  ABDOMEN: soft, nontender, nondistended  EXTREMITIES: no edema  NEURO: Alert and oriented x 3.  Grossly normal.  Moves all 4 extremities.      Vitals:    07/02/25 0802   BP: 142/90   BP Location: Left arm   Pulse: 64   SpO2: 98%   Weight: 90.7 kg (200 lb)     Wt Readings from Last 5 Encounters:   05/21/25 93.4 kg (206 lb)   01/01/25 93 kg (205 lb)   11/18/24 93 kg (205 lb)   07/15/24 90.7 kg (200 lb)   07/11/24 90.7 kg (199 lb 15.3 oz)     Body mass index is 26.39 kg/m².        Last Labs:  CMP:  Recent Labs     06/06/25  0705      K 4.2      CO2 25   ANIONGAP 9   BUN 26*   CREATININE 0.89   EGFR 95   GLUCOSE 92     Recent Labs     06/06/25  0705   ALBUMIN 4.3   ALKPHOS 125   ALT 21   AST 28   BILITOT 1.5*     CBC:  Recent Labs     06/06/25  0705   WBC 5.8   HGB 14.9   HCT 45.8      MCV 90.3     COAG: No results for input(s): \"INR\", \"DDIMERVTE\" in the last 8760 hours.  ENDO:  Recent Labs     06/06/25  0705   TSH 1.41      CARDIAC: No results for input(s): \"CKMB\", \"TROPHS\", \"BNP\" in the last 8760 hours.    No lab exists for component: \"CK\", \"CKMBP\"  Recent Labs     06/06/25  0705   CHOL 158   LDLCALC 86   HDL 51   TRIG 109               Assessment/Plan   65-year-old male with history of mild ascending aorta enlargement upwards of 4 cm dating back to around 2014, Parox SVT/AVNRT s/p ablation 2015, HTN. It appears his ascending aorta has been relatively stable in size of about 4 to 4.1 cm since 2014.  Mild aortic insufficiency is also been stable over the last decade.  Since oh stable, probably recheck aorta in 3 to 5 years.  Episode of presyncope in May of this year probably dehydration related.  Encouraging that he has had even more vigorous exercise since without any recurrence.  Echocardiogram reassuring.  I cannot entirely rule out arrhythmia although less likely.  I do not think a monitor here would be entirely " helpful and likely low yield.  I have asked him to monitor this clinically and contact me if has any recurrence of presyncope symptoms.  His blood pressure today is on the higher end but says he took his lisinopril just 30 minutes prior to the visit.  Recommended we follow-up in about 1 year to see how he is doing.      Followup Appts:  Future Appointments   Date Time Provider Department Center   7/10/2025 12:15 PM Abraham Stewart MD, MS [id number] [place]   9/8/2025  9:40 AM Mimi Romo, DO [id number] [place]   11/21/2025  8:30 AM ANNIE Cerna, CCC-A [id number] [place]   5/22/2026  9:00 AM Krista Campos, DO [id number] [place]           ____________________________________________________________  Shakeel Allen DO  [facility] [place]  [facility]         [1]   Past Medical History:  Diagnosis Date    Calculus of kidney 10/01/2018    Calculus of kidney    Calculus of kidney 10/01/2018    Calculus of kidney    Cellulitis, unspecified 01/04/2014    Cellulitis    Contusion, knee and lower leg, left, initial encounter 11/08/2023    Encounter for immunization 05/18/2015    Need for Td vaccine    Encounter for immunization 02/26/2015    Need for immunization against typhoid    Encounter for immunization 11/13/2018    Need for influenza vaccination    Encounter for screening for other viral diseases 05/18/2015    Need for hepatitis C screening test    Fever, unspecified 05/27/2020    Fever and chills    Knee injury, left, initial encounter 11/08/2023    Laceration of chin, initial encounter 11/08/2023    Laceration of lip, initial encounter 11/08/2023    Legionella pneumonia (Multi) 05/12/2023    Legionnaire's disease (Multi) 07/26/2022    Other conditions influencing health status 08/06/2012    Infected Nonvenomous Insect Bite On Trunk    Other specified disorders of right ear 11/28/2016    Blocked ear, right    Personal history of other diseases of the circulatory system  04/12/2016    History of paroxysmal supraventricular tachycardia    Personal history of other diseases of the digestive system 11/23/2012    History of cholelithiasis    Personal history of other diseases of the nervous system and sense organs 04/09/2014    History of amaurosis fugax    Personal history of other diseases of the respiratory system 12/10/2018    History of acute sinusitis    Personal history of other drug therapy 02/26/2015    History of hepatitis A vaccination    Personal history of other infectious and parasitic diseases     History of varicella    Personal history of other specified conditions 05/27/2020    History of dysuria    Pneumonitis 11/08/2023    Prostatic intraepithelial neoplasia     High grade prostatic intraepithelial neoplasia    Unspecified acute conjunctivitis, right eye 12/10/2018    Acute bacterial conjunctivitis of right eye   [2]   Past Surgical History:  Procedure Laterality Date    ABLATION OF DYSRHYTHMIC FOCUS  03/04/2015    Catheter Ablation    CT HEAD ANGIO W AND WO IV CONTRAST  10/09/2017    CT HEAD ANGIO W AND WO IV CONTRAST 10/9/2017 [facility]    LASER OF PROSTATE W/ GREEN LIGHT PVP      x 3 with Dr. Zarate last one was Hole[place]    LUMBAR FUSION  08/28/2016    Lumbar Vertebral Fusion    MOUTH SURGERY  11/05/2012    Oral Surgery Tooth Extraction    OTHER SURGICAL HISTORY  08/28/2016    Laminectomy Decompressive More Than Two Lumbar Segments    OTHER SURGICAL HISTORY  08/28/2016    Spinal Surgery - Allograft    OTHER SURGICAL HISTORY  10/13/2022    Back surgery    OTHER SURGICAL HISTORY  01/10/2014    Cystoscopy With Ureteroscopy With Lithotripsy    OTHER SURGICAL HISTORY  10/21/2020    Orthopedic Surgery    OTHER SURGICAL HISTORY  12/21/2013    Cystoscopy With Insertion Of Ureteral Stent Right    VASECTOMY  11/05/2012    Surgery Vas Deferens Vasectomy   [3]   Allergies  Allergen Reactions    Azithromycin Rash    Ciprofloxacin Rash    Erythromycin Rash     Sulfamethoxazole-Trimethoprim Rash    Tetracyclines Rash

## 2025-07-02 ENCOUNTER — OFFICE VISIT (OUTPATIENT)
Dept: CARDIOLOGY | Facility: CLINIC | Age: 65
End: 2025-07-02
Payer: MEDICARE

## 2025-07-02 VITALS
HEART RATE: 64 BPM | BODY MASS INDEX: 26.39 KG/M2 | WEIGHT: 200 LBS | SYSTOLIC BLOOD PRESSURE: 142 MMHG | DIASTOLIC BLOOD PRESSURE: 90 MMHG | OXYGEN SATURATION: 98 %

## 2025-07-02 DIAGNOSIS — I77.810 AORTIC ROOT DILATATION: ICD-10-CM

## 2025-07-02 DIAGNOSIS — I35.1 MILD AORTIC REGURGITATION: ICD-10-CM

## 2025-07-02 DIAGNOSIS — R55 NEAR SYNCOPE: Primary | ICD-10-CM

## 2025-07-02 PROCEDURE — 3077F SYST BP >= 140 MM HG: CPT | Performed by: INTERNAL MEDICINE

## 2025-07-02 PROCEDURE — 3080F DIAST BP >= 90 MM HG: CPT | Performed by: INTERNAL MEDICINE

## 2025-07-02 PROCEDURE — 99212 OFFICE O/P EST SF 10 MIN: CPT

## 2025-07-02 PROCEDURE — G2211 COMPLEX E/M VISIT ADD ON: HCPCS | Performed by: INTERNAL MEDICINE

## 2025-07-02 PROCEDURE — 1159F MED LIST DOCD IN RCRD: CPT | Performed by: INTERNAL MEDICINE

## 2025-07-02 PROCEDURE — 99204 OFFICE O/P NEW MOD 45 MIN: CPT | Performed by: INTERNAL MEDICINE

## 2025-07-10 ENCOUNTER — APPOINTMENT (OUTPATIENT)
Dept: GASTROENTEROLOGY | Facility: EXTERNAL LOCATION | Age: 65
End: 2025-07-10
Payer: MEDICARE

## 2025-07-10 DIAGNOSIS — R19.5 POSITIVE COLORECTAL CANCER SCREENING USING COLOGUARD TEST: Primary | ICD-10-CM

## 2025-07-10 DIAGNOSIS — D12.0 BENIGN NEOPLASM OF CECUM: ICD-10-CM

## 2025-07-10 DIAGNOSIS — Z12.11 COLON CANCER SCREENING: ICD-10-CM

## 2025-07-10 PROCEDURE — 45385 COLONOSCOPY W/LESION REMOVAL: CPT | Performed by: INTERNAL MEDICINE

## 2025-07-10 PROCEDURE — 88305 TISSUE EXAM BY PATHOLOGIST: CPT | Performed by: PATHOLOGY

## 2025-07-10 PROCEDURE — 88305 TISSUE EXAM BY PATHOLOGIST: CPT

## 2025-07-14 ENCOUNTER — LAB REQUISITION (OUTPATIENT)
Dept: LAB | Facility: HOSPITAL | Age: 65
End: 2025-07-14
Payer: MEDICARE

## 2025-07-14 DIAGNOSIS — R19.5 OTHER FECAL ABNORMALITIES: ICD-10-CM

## 2025-07-17 LAB
LABORATORY COMMENT REPORT: NORMAL
PATH REPORT.FINAL DX SPEC: NORMAL
PATH REPORT.GROSS SPEC: NORMAL
PATH REPORT.RELEVANT HX SPEC: NORMAL
PATH REPORT.TOTAL CANCER: NORMAL

## 2025-08-18 ENCOUNTER — APPOINTMENT (OUTPATIENT)
Dept: PRIMARY CARE | Facility: CLINIC | Age: 65
End: 2025-08-18
Payer: MEDICARE

## 2025-09-05 ENCOUNTER — APPOINTMENT (OUTPATIENT)
Dept: PRIMARY CARE | Facility: CLINIC | Age: 65
End: 2025-09-05
Payer: MEDICARE

## 2025-09-08 ENCOUNTER — APPOINTMENT (OUTPATIENT)
Dept: ALLERGY | Facility: CLINIC | Age: 65
End: 2025-09-08
Payer: MEDICARE

## 2025-10-06 ENCOUNTER — APPOINTMENT (OUTPATIENT)
Dept: PRIMARY CARE | Facility: CLINIC | Age: 65
End: 2025-10-06
Payer: MEDICARE

## 2026-05-22 ENCOUNTER — APPOINTMENT (OUTPATIENT)
Dept: PRIMARY CARE | Facility: CLINIC | Age: 66
End: 2026-05-22
Payer: MEDICARE

## 2026-06-01 ENCOUNTER — APPOINTMENT (OUTPATIENT)
Dept: PRIMARY CARE | Facility: CLINIC | Age: 66
End: 2026-06-01
Payer: MEDICARE